# Patient Record
Sex: MALE | Race: WHITE | NOT HISPANIC OR LATINO
[De-identification: names, ages, dates, MRNs, and addresses within clinical notes are randomized per-mention and may not be internally consistent; named-entity substitution may affect disease eponyms.]

---

## 2017-07-29 ENCOUNTER — APPOINTMENT (OUTPATIENT)
Dept: INTERNAL MEDICINE | Facility: CLINIC | Age: 82
End: 2017-07-29
Payer: MEDICARE

## 2017-07-29 VITALS
SYSTOLIC BLOOD PRESSURE: 120 MMHG | TEMPERATURE: 97.9 F | HEART RATE: 75 BPM | WEIGHT: 179 LBS | DIASTOLIC BLOOD PRESSURE: 70 MMHG | HEIGHT: 65 IN | BODY MASS INDEX: 29.82 KG/M2 | OXYGEN SATURATION: 97 %

## 2017-07-29 DIAGNOSIS — R60.9 EDEMA, UNSPECIFIED: ICD-10-CM

## 2017-07-29 DIAGNOSIS — E78.5 HYPERLIPIDEMIA, UNSPECIFIED: ICD-10-CM

## 2017-07-29 DIAGNOSIS — Z85.89 PERSONAL HISTORY OF MALIGNANT NEOPLASM OF OTHER ORGANS AND SYSTEMS: ICD-10-CM

## 2017-07-29 PROCEDURE — 99213 OFFICE O/P EST LOW 20 MIN: CPT

## 2017-08-07 ENCOUNTER — APPOINTMENT (OUTPATIENT)
Dept: INTERNAL MEDICINE | Facility: CLINIC | Age: 82
End: 2017-08-07
Payer: MEDICARE

## 2017-08-07 VITALS
RESPIRATION RATE: 18 BRPM | TEMPERATURE: 97.8 F | OXYGEN SATURATION: 96 % | DIASTOLIC BLOOD PRESSURE: 70 MMHG | HEIGHT: 65 IN | SYSTOLIC BLOOD PRESSURE: 130 MMHG | HEART RATE: 68 BPM | WEIGHT: 179 LBS | BODY MASS INDEX: 29.82 KG/M2

## 2017-08-07 PROCEDURE — 99214 OFFICE O/P EST MOD 30 MIN: CPT

## 2017-08-07 RX ORDER — BUDESONIDE 32 UG/1
32 SPRAY, METERED NASAL DAILY
Qty: 1 | Refills: 2 | Status: ACTIVE | COMMUNITY
Start: 2017-08-07

## 2017-11-11 ENCOUNTER — APPOINTMENT (OUTPATIENT)
Dept: INTERNAL MEDICINE | Facility: CLINIC | Age: 82
End: 2017-11-11
Payer: MEDICARE

## 2017-11-11 VITALS
HEIGHT: 65 IN | SYSTOLIC BLOOD PRESSURE: 130 MMHG | DIASTOLIC BLOOD PRESSURE: 70 MMHG | HEART RATE: 63 BPM | TEMPERATURE: 97.7 F | BODY MASS INDEX: 29.32 KG/M2 | OXYGEN SATURATION: 95 % | WEIGHT: 176 LBS

## 2017-11-11 DIAGNOSIS — Z23 ENCOUNTER FOR IMMUNIZATION: ICD-10-CM

## 2017-11-11 PROCEDURE — 99214 OFFICE O/P EST MOD 30 MIN: CPT

## 2017-12-16 ENCOUNTER — APPOINTMENT (OUTPATIENT)
Dept: INTERNAL MEDICINE | Facility: CLINIC | Age: 82
End: 2017-12-16
Payer: MEDICARE

## 2017-12-16 VITALS
TEMPERATURE: 97.9 F | OXYGEN SATURATION: 96 % | DIASTOLIC BLOOD PRESSURE: 62 MMHG | BODY MASS INDEX: 29.82 KG/M2 | SYSTOLIC BLOOD PRESSURE: 130 MMHG | WEIGHT: 179 LBS | HEIGHT: 65 IN | HEART RATE: 68 BPM

## 2017-12-16 DIAGNOSIS — R06.02 SHORTNESS OF BREATH: ICD-10-CM

## 2017-12-16 DIAGNOSIS — R05 COUGH: ICD-10-CM

## 2017-12-16 PROCEDURE — 99214 OFFICE O/P EST MOD 30 MIN: CPT

## 2018-01-20 ENCOUNTER — APPOINTMENT (OUTPATIENT)
Dept: INTERNAL MEDICINE | Facility: CLINIC | Age: 83
End: 2018-01-20

## 2018-04-28 ENCOUNTER — APPOINTMENT (OUTPATIENT)
Dept: INTERNAL MEDICINE | Facility: CLINIC | Age: 83
End: 2018-04-28
Payer: MEDICARE

## 2018-04-28 VITALS
TEMPERATURE: 98.3 F | SYSTOLIC BLOOD PRESSURE: 140 MMHG | DIASTOLIC BLOOD PRESSURE: 60 MMHG | HEIGHT: 65 IN | HEART RATE: 73 BPM | WEIGHT: 186 LBS | OXYGEN SATURATION: 97 % | BODY MASS INDEX: 30.99 KG/M2

## 2018-04-28 PROCEDURE — 83036 HEMOGLOBIN GLYCOSYLATED A1C: CPT | Mod: QW

## 2018-04-28 PROCEDURE — 99214 OFFICE O/P EST MOD 30 MIN: CPT | Mod: 25

## 2018-04-28 RX ORDER — BENZONATATE 200 MG/1
200 CAPSULE ORAL
Qty: 28 | Refills: 1 | Status: DISCONTINUED | COMMUNITY
Start: 2017-11-11 | End: 2018-04-28

## 2018-04-28 RX ORDER — PREDNISONE 5 MG/1
5 TABLET ORAL
Qty: 36 | Refills: 1 | Status: DISCONTINUED | COMMUNITY
Start: 2017-11-11 | End: 2018-04-28

## 2018-04-30 LAB — HBA1C MFR BLD HPLC: 7.4

## 2018-06-22 ENCOUNTER — MEDICATION RENEWAL (OUTPATIENT)
Age: 83
End: 2018-06-22

## 2018-07-21 ENCOUNTER — APPOINTMENT (OUTPATIENT)
Dept: INTERNAL MEDICINE | Facility: CLINIC | Age: 83
End: 2018-07-21
Payer: MEDICARE

## 2018-07-21 ENCOUNTER — NON-APPOINTMENT (OUTPATIENT)
Age: 83
End: 2018-07-21

## 2018-07-21 ENCOUNTER — OTHER (OUTPATIENT)
Age: 83
End: 2018-07-21

## 2018-07-21 VITALS
TEMPERATURE: 98.5 F | WEIGHT: 178 LBS | SYSTOLIC BLOOD PRESSURE: 138 MMHG | OXYGEN SATURATION: 98 % | HEIGHT: 65 IN | BODY MASS INDEX: 29.66 KG/M2 | DIASTOLIC BLOOD PRESSURE: 72 MMHG | HEART RATE: 66 BPM

## 2018-07-21 DIAGNOSIS — J01.80 OTHER ACUTE SINUSITIS: ICD-10-CM

## 2018-07-21 DIAGNOSIS — R05 COUGH: ICD-10-CM

## 2018-07-21 DIAGNOSIS — M65.9 SYNOVITIS AND TENOSYNOVITIS, UNSPECIFIED: ICD-10-CM

## 2018-07-21 DIAGNOSIS — Z87.09 PERSONAL HISTORY OF OTHER DISEASES OF THE RESPIRATORY SYSTEM: ICD-10-CM

## 2018-07-21 PROCEDURE — 82962 GLUCOSE BLOOD TEST: CPT

## 2018-07-21 PROCEDURE — 83036 HEMOGLOBIN GLYCOSYLATED A1C: CPT | Mod: QW

## 2018-07-21 PROCEDURE — 99214 OFFICE O/P EST MOD 30 MIN: CPT | Mod: 25

## 2018-07-21 NOTE — PHYSICAL EXAM
[No Acute Distress] : no acute distress [Normal Sclera/Conjunctiva] : normal sclera/conjunctiva [Normal Outer Ear/Nose] : the outer ears and nose were normal in appearance [No JVD] : no jugular venous distention [No Respiratory Distress] : no respiratory distress  [Clear to Auscultation] : lungs were clear to auscultation bilaterally [Normal Rate] : normal rate  [Regular Rhythm] : with a regular rhythm [Alert and Oriented x3] : oriented to person, place, and time

## 2018-07-21 NOTE — HISTORY OF PRESENT ILLNESS
[FreeTextEntry1] : f/u to CAD, HTN and DM. [de-identified] : Pt denies any cardiac symptoms. His HTN si controlled and the DM to be tested today.

## 2018-07-23 LAB
GLUCOSE BLDC GLUCOMTR-MCNC: 132
HBA1C MFR BLD HPLC: 6.8

## 2018-11-03 ENCOUNTER — APPOINTMENT (OUTPATIENT)
Dept: INTERNAL MEDICINE | Facility: CLINIC | Age: 83
End: 2018-11-03
Payer: MEDICARE

## 2018-11-03 VITALS
SYSTOLIC BLOOD PRESSURE: 140 MMHG | TEMPERATURE: 98 F | HEART RATE: 75 BPM | WEIGHT: 181 LBS | BODY MASS INDEX: 30.16 KG/M2 | DIASTOLIC BLOOD PRESSURE: 80 MMHG | OXYGEN SATURATION: 95 % | HEIGHT: 65 IN

## 2018-11-03 PROCEDURE — 99214 OFFICE O/P EST MOD 30 MIN: CPT

## 2018-12-06 ENCOUNTER — MEDICATION RENEWAL (OUTPATIENT)
Age: 83
End: 2018-12-06

## 2019-02-09 ENCOUNTER — APPOINTMENT (OUTPATIENT)
Dept: INTERNAL MEDICINE | Facility: CLINIC | Age: 84
End: 2019-02-09
Payer: MEDICARE

## 2019-02-09 VITALS
WEIGHT: 183 LBS | BODY MASS INDEX: 30.49 KG/M2 | TEMPERATURE: 97.7 F | OXYGEN SATURATION: 97 % | HEIGHT: 65 IN | DIASTOLIC BLOOD PRESSURE: 70 MMHG | HEART RATE: 70 BPM | SYSTOLIC BLOOD PRESSURE: 144 MMHG

## 2019-02-09 DIAGNOSIS — N40.0 BENIGN PROSTATIC HYPERPLASIA WITHOUT LOWER URINARY TRACT SYMPMS: ICD-10-CM

## 2019-02-09 PROCEDURE — 83036 HEMOGLOBIN GLYCOSYLATED A1C: CPT | Mod: QW

## 2019-02-09 PROCEDURE — 99214 OFFICE O/P EST MOD 30 MIN: CPT | Mod: 25

## 2019-02-09 PROCEDURE — 82962 GLUCOSE BLOOD TEST: CPT

## 2019-02-09 RX ORDER — AMOXICILLIN 500 MG/1
500 CAPSULE ORAL
Qty: 21 | Refills: 0 | Status: COMPLETED | COMMUNITY
Start: 2018-11-13

## 2019-02-11 LAB
GLUCOSE BLDC GLUCOMTR-MCNC: 195
HBA1C MFR BLD HPLC: 7.3

## 2019-05-18 ENCOUNTER — APPOINTMENT (OUTPATIENT)
Dept: INTERNAL MEDICINE | Facility: CLINIC | Age: 84
End: 2019-05-18
Payer: MEDICARE

## 2019-05-18 VITALS
HEART RATE: 73 BPM | SYSTOLIC BLOOD PRESSURE: 120 MMHG | DIASTOLIC BLOOD PRESSURE: 60 MMHG | TEMPERATURE: 98.2 F | HEIGHT: 65 IN | BODY MASS INDEX: 29.99 KG/M2 | OXYGEN SATURATION: 95 % | WEIGHT: 180 LBS

## 2019-05-18 DIAGNOSIS — R91.1 SOLITARY PULMONARY NODULE: ICD-10-CM

## 2019-05-18 DIAGNOSIS — H04.123 DRY EYE SYNDROME OF BILATERAL LACRIMAL GLANDS: ICD-10-CM

## 2019-05-18 PROCEDURE — 99214 OFFICE O/P EST MOD 30 MIN: CPT

## 2019-05-18 RX ORDER — CYCLOSPORINE 0.5 MG/ML
0.05 EMULSION OPHTHALMIC
Qty: 1 | Refills: 0 | Status: ACTIVE | COMMUNITY
Start: 2019-05-18

## 2019-06-29 ENCOUNTER — APPOINTMENT (OUTPATIENT)
Dept: INTERNAL MEDICINE | Facility: CLINIC | Age: 84
End: 2019-06-29
Payer: MEDICARE

## 2019-06-29 VITALS
HEART RATE: 65 BPM | HEIGHT: 65 IN | DIASTOLIC BLOOD PRESSURE: 74 MMHG | WEIGHT: 180 LBS | TEMPERATURE: 98.1 F | BODY MASS INDEX: 29.99 KG/M2 | OXYGEN SATURATION: 98 % | SYSTOLIC BLOOD PRESSURE: 120 MMHG

## 2019-06-29 PROCEDURE — 82962 GLUCOSE BLOOD TEST: CPT

## 2019-06-29 PROCEDURE — 99214 OFFICE O/P EST MOD 30 MIN: CPT | Mod: 25

## 2019-06-29 PROCEDURE — 83036 HEMOGLOBIN GLYCOSYLATED A1C: CPT | Mod: QW

## 2019-07-01 LAB
GLUCOSE BLDC GLUCOMTR-MCNC: 132
HBA1C MFR BLD HPLC: 7.3

## 2019-10-26 ENCOUNTER — APPOINTMENT (OUTPATIENT)
Dept: INTERNAL MEDICINE | Facility: CLINIC | Age: 84
End: 2019-10-26
Payer: MEDICARE

## 2019-10-26 VITALS
DIASTOLIC BLOOD PRESSURE: 80 MMHG | HEIGHT: 65 IN | HEART RATE: 65 BPM | SYSTOLIC BLOOD PRESSURE: 140 MMHG | WEIGHT: 178 LBS | TEMPERATURE: 98.4 F | OXYGEN SATURATION: 98 % | BODY MASS INDEX: 29.66 KG/M2

## 2019-10-26 PROCEDURE — 82962 GLUCOSE BLOOD TEST: CPT

## 2019-10-26 PROCEDURE — 83036 HEMOGLOBIN GLYCOSYLATED A1C: CPT | Mod: QW

## 2019-10-26 PROCEDURE — 99214 OFFICE O/P EST MOD 30 MIN: CPT | Mod: 25

## 2019-10-28 LAB
GLUCOSE BLDC GLUCOMTR-MCNC: 108
HBA1C MFR BLD HPLC: 6.5

## 2020-02-22 ENCOUNTER — APPOINTMENT (OUTPATIENT)
Dept: INTERNAL MEDICINE | Facility: CLINIC | Age: 85
End: 2020-02-22
Payer: MEDICARE

## 2020-02-22 VITALS
WEIGHT: 176 LBS | HEIGHT: 65 IN | TEMPERATURE: 97.9 F | OXYGEN SATURATION: 96 % | SYSTOLIC BLOOD PRESSURE: 132 MMHG | DIASTOLIC BLOOD PRESSURE: 70 MMHG | HEART RATE: 66 BPM | BODY MASS INDEX: 29.32 KG/M2

## 2020-02-22 PROCEDURE — 99214 OFFICE O/P EST MOD 30 MIN: CPT | Mod: 25

## 2020-02-22 PROCEDURE — 82962 GLUCOSE BLOOD TEST: CPT

## 2020-02-22 PROCEDURE — 83036 HEMOGLOBIN GLYCOSYLATED A1C: CPT | Mod: QW

## 2020-02-24 LAB
GLUCOSE BLDC GLUCOMTR-MCNC: 125
HBA1C MFR BLD HPLC: 7.3

## 2020-03-16 ENCOUNTER — OUTPATIENT (OUTPATIENT)
Dept: OUTPATIENT SERVICES | Facility: HOSPITAL | Age: 85
LOS: 1 days | End: 2020-03-16

## 2020-03-16 ENCOUNTER — APPOINTMENT (OUTPATIENT)
Dept: NUCLEAR MEDICINE | Facility: CLINIC | Age: 85
End: 2020-03-16
Payer: MEDICARE

## 2020-03-16 ENCOUNTER — RESULT REVIEW (OUTPATIENT)
Age: 85
End: 2020-03-16

## 2020-03-16 DIAGNOSIS — R91.8 OTHER NONSPECIFIC ABNORMAL FINDING OF LUNG FIELD: ICD-10-CM

## 2020-03-16 PROCEDURE — 78815 PET IMAGE W/CT SKULL-THIGH: CPT | Mod: 26,PI

## 2020-04-04 ENCOUNTER — APPOINTMENT (OUTPATIENT)
Dept: INTERNAL MEDICINE | Facility: CLINIC | Age: 85
End: 2020-04-04
Payer: MEDICARE

## 2020-04-04 DIAGNOSIS — R91.8 OTHER NONSPECIFIC ABNORMAL FINDING OF LUNG FIELD: ICD-10-CM

## 2020-04-04 PROCEDURE — 99442: CPT

## 2020-10-10 ENCOUNTER — APPOINTMENT (OUTPATIENT)
Dept: INTERNAL MEDICINE | Facility: CLINIC | Age: 85
End: 2020-10-10
Payer: MEDICARE

## 2020-10-10 VITALS
HEIGHT: 65 IN | OXYGEN SATURATION: 96 % | WEIGHT: 176 LBS | HEART RATE: 70 BPM | DIASTOLIC BLOOD PRESSURE: 80 MMHG | BODY MASS INDEX: 29.32 KG/M2 | TEMPERATURE: 98.1 F | SYSTOLIC BLOOD PRESSURE: 140 MMHG

## 2020-10-10 PROCEDURE — 82962 GLUCOSE BLOOD TEST: CPT

## 2020-10-10 PROCEDURE — 83036 HEMOGLOBIN GLYCOSYLATED A1C: CPT | Mod: QW

## 2020-10-10 PROCEDURE — G0008: CPT

## 2020-10-10 PROCEDURE — 90662 IIV NO PRSV INCREASED AG IM: CPT

## 2020-10-10 PROCEDURE — 99214 OFFICE O/P EST MOD 30 MIN: CPT | Mod: 25

## 2020-10-12 LAB
GLUCOSE BLDC GLUCOMTR-MCNC: 120
HBA1C MFR BLD HPLC: 7

## 2021-01-01 ENCOUNTER — APPOINTMENT (OUTPATIENT)
Dept: INTERNAL MEDICINE | Facility: CLINIC | Age: 86
End: 2021-01-01

## 2021-01-01 ENCOUNTER — APPOINTMENT (OUTPATIENT)
Dept: INTERNAL MEDICINE | Facility: CLINIC | Age: 86
End: 2021-01-01
Payer: MEDICARE

## 2021-01-01 VITALS
DIASTOLIC BLOOD PRESSURE: 70 MMHG | BODY MASS INDEX: 29.99 KG/M2 | WEIGHT: 180 LBS | TEMPERATURE: 97.8 F | HEART RATE: 70 BPM | SYSTOLIC BLOOD PRESSURE: 150 MMHG | HEIGHT: 65 IN | OXYGEN SATURATION: 96 %

## 2021-01-01 DIAGNOSIS — L30.9 DERMATITIS, UNSPECIFIED: ICD-10-CM

## 2021-01-01 DIAGNOSIS — I25.10 ATHEROSCLEROTIC HEART DISEASE OF NATIVE CORONARY ARTERY W/OUT ANGINA PECTORIS: ICD-10-CM

## 2021-01-01 DIAGNOSIS — I10 ESSENTIAL (PRIMARY) HYPERTENSION: ICD-10-CM

## 2021-01-01 DIAGNOSIS — E11.9 TYPE 2 DIABETES MELLITUS W/OUT COMPLICATIONS: ICD-10-CM

## 2021-01-01 DIAGNOSIS — G54.6 PHANTOM LIMB SYNDROME WITH PAIN: ICD-10-CM

## 2021-01-01 LAB
ANION GAP SERPL CALC-SCNC: 9 MMOL/L
BUN SERPL-MCNC: 13 MG/DL
CALCIUM SERPL-MCNC: 9.8 MG/DL
CHLORIDE SERPL-SCNC: 105 MMOL/L
CO2 SERPL-SCNC: 28 MMOL/L
CREAT SERPL-MCNC: 0.82 MG/DL
GLUCOSE BLDC GLUCOMTR-MCNC: 118
GLUCOSE SERPL-MCNC: 144 MG/DL
POTASSIUM SERPL-SCNC: 4.9 MMOL/L
SODIUM SERPL-SCNC: 142 MMOL/L

## 2021-01-01 PROCEDURE — 83036 HEMOGLOBIN GLYCOSYLATED A1C: CPT | Mod: QW

## 2021-01-01 PROCEDURE — 90662 IIV NO PRSV INCREASED AG IM: CPT

## 2021-01-01 PROCEDURE — 99214 OFFICE O/P EST MOD 30 MIN: CPT | Mod: 25

## 2021-01-01 PROCEDURE — G0008: CPT

## 2021-01-01 PROCEDURE — 82962 GLUCOSE BLOOD TEST: CPT

## 2021-01-01 PROCEDURE — 36415 COLL VENOUS BLD VENIPUNCTURE: CPT

## 2021-01-01 RX ORDER — CRISABOROLE 20 MG/G
2 OINTMENT TOPICAL DAILY
Qty: 1 | Refills: 0 | Status: ACTIVE | COMMUNITY
Start: 2021-01-01

## 2021-01-01 RX ORDER — DUTASTERIDE 0.5 MG/1
0.5 CAPSULE, LIQUID FILLED ORAL
Qty: 30 | Refills: 2 | Status: ACTIVE | COMMUNITY
Start: 2021-01-01 | End: 1900-01-01

## 2021-01-01 RX ORDER — POLYVINYL ALCOHOL 14 MG/ML
1.4 SOLUTION/ DROPS OPHTHALMIC
Qty: 1 | Refills: 0 | Status: ACTIVE | COMMUNITY
Start: 2021-01-01

## 2021-01-09 ENCOUNTER — APPOINTMENT (OUTPATIENT)
Dept: INTERNAL MEDICINE | Facility: CLINIC | Age: 86
End: 2021-01-09
Payer: MEDICARE

## 2021-01-09 VITALS
HEIGHT: 65 IN | WEIGHT: 182 LBS | BODY MASS INDEX: 30.32 KG/M2 | OXYGEN SATURATION: 96 % | DIASTOLIC BLOOD PRESSURE: 70 MMHG | SYSTOLIC BLOOD PRESSURE: 130 MMHG | TEMPERATURE: 97.3 F | HEART RATE: 71 BPM

## 2021-01-09 DIAGNOSIS — J30.9 ALLERGIC RHINITIS, UNSPECIFIED: ICD-10-CM

## 2021-01-09 DIAGNOSIS — C91.40 HAIRY CELL LEUKEMIA NOT HAVING ACHIEVED REMISSION: ICD-10-CM

## 2021-01-09 PROCEDURE — 99072 ADDL SUPL MATRL&STAF TM PHE: CPT

## 2021-01-09 PROCEDURE — 83036 HEMOGLOBIN GLYCOSYLATED A1C: CPT | Mod: QW

## 2021-01-09 PROCEDURE — 99214 OFFICE O/P EST MOD 30 MIN: CPT | Mod: 25

## 2021-10-04 PROBLEM — L30.9 ECZEMA: Status: ACTIVE | Noted: 2021-01-01

## 2021-10-04 PROBLEM — G54.6 PHANTOM LIMB PAIN: Status: ACTIVE | Noted: 2019-10-26

## 2022-01-01 ENCOUNTER — TRANSCRIPTION ENCOUNTER (OUTPATIENT)
Age: 87
End: 2022-01-01

## 2022-01-01 ENCOUNTER — INPATIENT (INPATIENT)
Facility: HOSPITAL | Age: 87
LOS: 9 days | DRG: 329 | End: 2022-01-23
Attending: SURGERY | Admitting: SURGERY
Payer: MEDICARE

## 2022-01-01 ENCOUNTER — EMERGENCY (EMERGENCY)
Facility: HOSPITAL | Age: 87
LOS: 1 days | Discharge: HOME | End: 2022-01-01
Attending: STUDENT IN AN ORGANIZED HEALTH CARE EDUCATION/TRAINING PROGRAM
Payer: MEDICARE

## 2022-01-01 ENCOUNTER — RESULT REVIEW (OUTPATIENT)
Age: 87
End: 2022-01-01

## 2022-01-01 VITALS
RESPIRATION RATE: 18 BRPM | HEART RATE: 86 BPM | SYSTOLIC BLOOD PRESSURE: 153 MMHG | OXYGEN SATURATION: 98 % | TEMPERATURE: 98 F | DIASTOLIC BLOOD PRESSURE: 94 MMHG

## 2022-01-01 VITALS
SYSTOLIC BLOOD PRESSURE: 189 MMHG | RESPIRATION RATE: 20 BRPM | OXYGEN SATURATION: 96 % | TEMPERATURE: 98 F | DIASTOLIC BLOOD PRESSURE: 105 MMHG | HEART RATE: 74 BPM | HEIGHT: 64 IN | WEIGHT: 179.9 LBS

## 2022-01-01 VITALS
OXYGEN SATURATION: 97 % | TEMPERATURE: 98 F | SYSTOLIC BLOOD PRESSURE: 85 MMHG | DIASTOLIC BLOOD PRESSURE: 58 MMHG | WEIGHT: 169.98 LBS | RESPIRATION RATE: 20 BRPM | HEART RATE: 97 BPM | HEIGHT: 64 IN

## 2022-01-01 DIAGNOSIS — Z90.49 ACQUIRED ABSENCE OF OTHER SPECIFIED PARTS OF DIGESTIVE TRACT: Chronic | ICD-10-CM

## 2022-01-01 DIAGNOSIS — K56.609 UNSPECIFIED INTESTINAL OBSTRUCTION, UNSPECIFIED AS TO PARTIAL VERSUS COMPLETE OBSTRUCTION: ICD-10-CM

## 2022-01-01 LAB
-  AMIKACIN: SIGNIFICANT CHANGE UP
-  AMOXICILLIN/CLAVULANIC ACID: SIGNIFICANT CHANGE UP
-  AMPICILLIN/SULBACTAM: SIGNIFICANT CHANGE UP
-  AMPICILLIN: SIGNIFICANT CHANGE UP
-  AZTREONAM: SIGNIFICANT CHANGE UP
-  CEFAZOLIN: SIGNIFICANT CHANGE UP
-  CEFEPIME: SIGNIFICANT CHANGE UP
-  CEFOXITIN: SIGNIFICANT CHANGE UP
-  CEFTRIAXONE: SIGNIFICANT CHANGE UP
-  CIPROFLOXACIN: SIGNIFICANT CHANGE UP
-  ERTAPENEM: SIGNIFICANT CHANGE UP
-  GENTAMICIN: SIGNIFICANT CHANGE UP
-  LEVOFLOXACIN: SIGNIFICANT CHANGE UP
-  MEROPENEM: SIGNIFICANT CHANGE UP
-  PIPERACILLIN/TAZOBACTAM: SIGNIFICANT CHANGE UP
-  TOBRAMYCIN: SIGNIFICANT CHANGE UP
-  TRIMETHOPRIM/SULFAMETHOXAZOLE: SIGNIFICANT CHANGE UP
A1C WITH ESTIMATED AVERAGE GLUCOSE RESULT: 8.6 % — HIGH (ref 4–5.6)
ABO RH CONFIRMATION: SIGNIFICANT CHANGE UP
ACETONE SERPL-MCNC: NEGATIVE — SIGNIFICANT CHANGE UP
ACETONE SERPL-MCNC: NEGATIVE — SIGNIFICANT CHANGE UP
ALBUMIN SERPL ELPH-MCNC: 1.5 G/DL — LOW (ref 3.3–5.2)
ALBUMIN SERPL ELPH-MCNC: 1.5 G/DL — LOW (ref 3.3–5.2)
ALBUMIN SERPL ELPH-MCNC: 1.6 G/DL — LOW (ref 3.3–5.2)
ALBUMIN SERPL ELPH-MCNC: 2 G/DL — LOW (ref 3.3–5.2)
ALBUMIN SERPL ELPH-MCNC: 3.6 G/DL — SIGNIFICANT CHANGE UP (ref 3.3–5.2)
ALP SERPL-CCNC: 108 U/L — SIGNIFICANT CHANGE UP (ref 40–120)
ALP SERPL-CCNC: 112 U/L — SIGNIFICANT CHANGE UP (ref 40–120)
ALP SERPL-CCNC: 125 U/L — HIGH (ref 40–120)
ALP SERPL-CCNC: 73 U/L — SIGNIFICANT CHANGE UP (ref 40–120)
ALP SERPL-CCNC: 95 U/L — SIGNIFICANT CHANGE UP (ref 40–120)
ALT FLD-CCNC: 11 U/L — SIGNIFICANT CHANGE UP
ALT FLD-CCNC: 17 U/L — SIGNIFICANT CHANGE UP
ALT FLD-CCNC: 20 U/L — SIGNIFICANT CHANGE UP
ALT FLD-CCNC: 32 U/L — SIGNIFICANT CHANGE UP
ALT FLD-CCNC: 40 U/L — SIGNIFICANT CHANGE UP
ANION GAP SERPL CALC-SCNC: 10 MMOL/L — SIGNIFICANT CHANGE UP (ref 5–17)
ANION GAP SERPL CALC-SCNC: 10 MMOL/L — SIGNIFICANT CHANGE UP (ref 5–17)
ANION GAP SERPL CALC-SCNC: 11 MMOL/L — SIGNIFICANT CHANGE UP (ref 5–17)
ANION GAP SERPL CALC-SCNC: 12 MMOL/L — SIGNIFICANT CHANGE UP (ref 5–17)
ANION GAP SERPL CALC-SCNC: 12 MMOL/L — SIGNIFICANT CHANGE UP (ref 5–17)
ANION GAP SERPL CALC-SCNC: 13 MMOL/L — SIGNIFICANT CHANGE UP (ref 5–17)
ANION GAP SERPL CALC-SCNC: 13 MMOL/L — SIGNIFICANT CHANGE UP (ref 5–17)
ANION GAP SERPL CALC-SCNC: 14 MMOL/L — SIGNIFICANT CHANGE UP (ref 5–17)
ANION GAP SERPL CALC-SCNC: 15 MMOL/L — SIGNIFICANT CHANGE UP (ref 5–17)
ANION GAP SERPL CALC-SCNC: 15 MMOL/L — SIGNIFICANT CHANGE UP (ref 5–17)
ANION GAP SERPL CALC-SCNC: 16 MMOL/L — SIGNIFICANT CHANGE UP (ref 5–17)
ANION GAP SERPL CALC-SCNC: 17 MMOL/L — SIGNIFICANT CHANGE UP (ref 5–17)
ANION GAP SERPL CALC-SCNC: 17 MMOL/L — SIGNIFICANT CHANGE UP (ref 5–17)
ANION GAP SERPL CALC-SCNC: 18 MMOL/L — HIGH (ref 5–17)
ANION GAP SERPL CALC-SCNC: 19 MMOL/L — HIGH (ref 5–17)
ANION GAP SERPL CALC-SCNC: 19 MMOL/L — HIGH (ref 5–17)
ANION GAP SERPL CALC-SCNC: 20 MMOL/L — HIGH (ref 5–17)
ANION GAP SERPL CALC-SCNC: 22 MMOL/L — HIGH (ref 5–17)
ANISOCYTOSIS BLD QL: SLIGHT — SIGNIFICANT CHANGE UP
APPEARANCE UR: ABNORMAL
APTT BLD: 23.9 SEC — LOW (ref 27.5–35.5)
APTT BLD: 29.2 SEC — SIGNIFICANT CHANGE UP (ref 27.5–35.5)
APTT BLD: 29.4 SEC — SIGNIFICANT CHANGE UP (ref 27.5–35.5)
APTT BLD: 53.5 SEC — HIGH (ref 27.5–35.5)
APTT BLD: 65 SEC — HIGH (ref 27.5–35.5)
APTT BLD: 81 SEC — HIGH (ref 27.5–35.5)
APTT BLD: 87.7 SEC — HIGH (ref 27.5–35.5)
AST SERPL-CCNC: 12 U/L — SIGNIFICANT CHANGE UP
AST SERPL-CCNC: 23 U/L — SIGNIFICANT CHANGE UP
AST SERPL-CCNC: 35 U/L — SIGNIFICANT CHANGE UP
AST SERPL-CCNC: 50 U/L — HIGH
AST SERPL-CCNC: 79 U/L — HIGH
BACTERIA # UR AUTO: ABNORMAL
BASE EXCESS BLDV CALC-SCNC: -9 MMOL/L — LOW (ref -2–3)
BASE EXCESS BLDV CALC-SCNC: 0.8 MMOL/L — SIGNIFICANT CHANGE UP (ref -2–3)
BASOPHILS # BLD AUTO: 0 K/UL — SIGNIFICANT CHANGE UP (ref 0–0.2)
BASOPHILS # BLD AUTO: 0.03 K/UL — SIGNIFICANT CHANGE UP (ref 0–0.2)
BASOPHILS # BLD AUTO: 0.03 K/UL — SIGNIFICANT CHANGE UP (ref 0–0.2)
BASOPHILS # BLD AUTO: 0.06 K/UL — SIGNIFICANT CHANGE UP (ref 0–0.2)
BASOPHILS # BLD AUTO: 0.07 K/UL — SIGNIFICANT CHANGE UP (ref 0–0.2)
BASOPHILS # BLD AUTO: 0.11 K/UL — SIGNIFICANT CHANGE UP (ref 0–0.2)
BASOPHILS # BLD AUTO: 0.14 K/UL — SIGNIFICANT CHANGE UP (ref 0–0.2)
BASOPHILS NFR BLD AUTO: 0 % — SIGNIFICANT CHANGE UP (ref 0–2)
BASOPHILS NFR BLD AUTO: 0.1 % — SIGNIFICANT CHANGE UP (ref 0–2)
BASOPHILS NFR BLD AUTO: 0.2 % — SIGNIFICANT CHANGE UP (ref 0–2)
BASOPHILS NFR BLD AUTO: 0.3 % — SIGNIFICANT CHANGE UP (ref 0–2)
BASOPHILS NFR BLD AUTO: 0.3 % — SIGNIFICANT CHANGE UP (ref 0–2)
BASOPHILS NFR BLD AUTO: 0.5 % — SIGNIFICANT CHANGE UP (ref 0–2)
BASOPHILS NFR BLD AUTO: 0.5 % — SIGNIFICANT CHANGE UP (ref 0–2)
BILIRUB DIRECT SERPL-MCNC: 0.2 MG/DL — SIGNIFICANT CHANGE UP (ref 0–0.3)
BILIRUB INDIRECT FLD-MCNC: 0.1 MG/DL — LOW (ref 0.2–1)
BILIRUB SERPL-MCNC: 0.3 MG/DL — LOW (ref 0.4–2)
BILIRUB SERPL-MCNC: 0.4 MG/DL — SIGNIFICANT CHANGE UP (ref 0.4–2)
BILIRUB SERPL-MCNC: 0.4 MG/DL — SIGNIFICANT CHANGE UP (ref 0.4–2)
BILIRUB SERPL-MCNC: 0.5 MG/DL — SIGNIFICANT CHANGE UP (ref 0.4–2)
BILIRUB SERPL-MCNC: 0.7 MG/DL — SIGNIFICANT CHANGE UP (ref 0.4–2)
BILIRUB UR-MCNC: NEGATIVE — SIGNIFICANT CHANGE UP
BLD GP AB SCN SERPL QL: SIGNIFICANT CHANGE UP
BLOOD GAS COMMENTS, VENOUS: SIGNIFICANT CHANGE UP
BODY SURFACE AREA CALCULATION: 1.73 M2 — SIGNIFICANT CHANGE UP
BUN SERPL-MCNC: 18.1 MG/DL — SIGNIFICANT CHANGE UP (ref 8–20)
BUN SERPL-MCNC: 19 MG/DL — SIGNIFICANT CHANGE UP (ref 8–20)
BUN SERPL-MCNC: 20.2 MG/DL — HIGH (ref 8–20)
BUN SERPL-MCNC: 23 MG/DL — HIGH (ref 8–20)
BUN SERPL-MCNC: 26.2 MG/DL — HIGH (ref 8–20)
BUN SERPL-MCNC: 29.9 MG/DL — HIGH (ref 8–20)
BUN SERPL-MCNC: 32.5 MG/DL — HIGH (ref 8–20)
BUN SERPL-MCNC: 45.4 MG/DL — HIGH (ref 8–20)
BUN SERPL-MCNC: 48.2 MG/DL — HIGH (ref 8–20)
BUN SERPL-MCNC: 51.8 MG/DL — HIGH (ref 8–20)
BUN SERPL-MCNC: 55.6 MG/DL — HIGH (ref 8–20)
BUN SERPL-MCNC: 60.5 MG/DL — HIGH (ref 8–20)
BUN SERPL-MCNC: 60.5 MG/DL — HIGH (ref 8–20)
BUN SERPL-MCNC: 61.9 MG/DL — HIGH (ref 8–20)
BUN SERPL-MCNC: 69.3 MG/DL — HIGH (ref 8–20)
BUN SERPL-MCNC: 70.4 MG/DL — HIGH (ref 8–20)
BUN SERPL-MCNC: 73.7 MG/DL — HIGH (ref 8–20)
BUN SERPL-MCNC: 76.2 MG/DL — HIGH (ref 8–20)
BUN SERPL-MCNC: 79.1 MG/DL — HIGH (ref 8–20)
BUN SERPL-MCNC: 93.6 MG/DL — HIGH (ref 8–20)
BURR CELLS BLD QL SMEAR: PRESENT — SIGNIFICANT CHANGE UP
CA-I SERPL-SCNC: 1.15 MMOL/L — SIGNIFICANT CHANGE UP (ref 1.15–1.33)
CALCIUM SERPL-MCNC: 6.8 MG/DL — LOW (ref 8.6–10.2)
CALCIUM SERPL-MCNC: 6.8 MG/DL — LOW (ref 8.6–10.2)
CALCIUM SERPL-MCNC: 6.9 MG/DL — LOW (ref 8.6–10.2)
CALCIUM SERPL-MCNC: 7.1 MG/DL — LOW (ref 8.6–10.2)
CALCIUM SERPL-MCNC: 7.2 MG/DL — LOW (ref 8.6–10.2)
CALCIUM SERPL-MCNC: 7.3 MG/DL — LOW (ref 8.6–10.2)
CALCIUM SERPL-MCNC: 7.4 MG/DL — LOW (ref 8.6–10.2)
CALCIUM SERPL-MCNC: 7.5 MG/DL — LOW (ref 8.6–10.2)
CALCIUM SERPL-MCNC: 7.5 MG/DL — LOW (ref 8.6–10.2)
CALCIUM SERPL-MCNC: 7.6 MG/DL — LOW (ref 8.6–10.2)
CALCIUM SERPL-MCNC: 7.7 MG/DL — LOW (ref 8.6–10.2)
CALCIUM SERPL-MCNC: 7.8 MG/DL — LOW (ref 8.6–10.2)
CALCIUM SERPL-MCNC: 7.8 MG/DL — LOW (ref 8.6–10.2)
CALCIUM SERPL-MCNC: 7.9 MG/DL — LOW (ref 8.6–10.2)
CALCIUM SERPL-MCNC: 7.9 MG/DL — LOW (ref 8.6–10.2)
CALCIUM SERPL-MCNC: 8.5 MG/DL — LOW (ref 8.6–10.2)
CALCIUM SERPL-MCNC: 9.2 MG/DL — SIGNIFICANT CHANGE UP (ref 8.6–10.2)
CALCIUM SERPL-MCNC: 9.3 MG/DL — SIGNIFICANT CHANGE UP (ref 8.6–10.2)
CHLORIDE BLDV-SCNC: 97 MMOL/L — LOW (ref 98–107)
CHLORIDE SERPL-SCNC: 108 MMOL/L — HIGH (ref 98–107)
CHLORIDE SERPL-SCNC: 109 MMOL/L — HIGH (ref 98–107)
CHLORIDE SERPL-SCNC: 110 MMOL/L — HIGH (ref 98–107)
CHLORIDE SERPL-SCNC: 112 MMOL/L — HIGH (ref 98–107)
CHLORIDE SERPL-SCNC: 113 MMOL/L — HIGH (ref 98–107)
CHLORIDE SERPL-SCNC: 116 MMOL/L — HIGH (ref 98–107)
CHLORIDE SERPL-SCNC: 118 MMOL/L — HIGH (ref 98–107)
CHLORIDE SERPL-SCNC: 119 MMOL/L — HIGH (ref 98–107)
CHLORIDE SERPL-SCNC: 119 MMOL/L — HIGH (ref 98–107)
CHLORIDE SERPL-SCNC: 120 MMOL/L — HIGH (ref 98–107)
CHLORIDE SERPL-SCNC: 120 MMOL/L — HIGH (ref 98–107)
CHLORIDE SERPL-SCNC: 95 MMOL/L — LOW (ref 98–107)
CHLORIDE SERPL-SCNC: 96 MMOL/L — LOW (ref 98–107)
CHLORIDE UR-SCNC: 115 MMOL/L — SIGNIFICANT CHANGE UP
CK SERPL-CCNC: 34 U/L — SIGNIFICANT CHANGE UP (ref 30–200)
CO2 SERPL-SCNC: 13 MMOL/L — LOW (ref 22–29)
CO2 SERPL-SCNC: 14 MMOL/L — LOW (ref 22–29)
CO2 SERPL-SCNC: 15 MMOL/L — LOW (ref 22–29)
CO2 SERPL-SCNC: 15 MMOL/L — LOW (ref 22–29)
CO2 SERPL-SCNC: 18 MMOL/L — LOW (ref 22–29)
CO2 SERPL-SCNC: 19 MMOL/L — LOW (ref 22–29)
CO2 SERPL-SCNC: 20 MMOL/L — LOW (ref 22–29)
CO2 SERPL-SCNC: 20 MMOL/L — LOW (ref 22–29)
CO2 SERPL-SCNC: 21 MMOL/L — LOW (ref 22–29)
CO2 SERPL-SCNC: 22 MMOL/L — SIGNIFICANT CHANGE UP (ref 22–29)
CO2 SERPL-SCNC: 23 MMOL/L — SIGNIFICANT CHANGE UP (ref 22–29)
CO2 SERPL-SCNC: 23 MMOL/L — SIGNIFICANT CHANGE UP (ref 22–29)
COLLECT DURATION TIME UR: 24 HR — SIGNIFICANT CHANGE UP
COLLECT DURATION TIME UR: 24 HR — SIGNIFICANT CHANGE UP
COLOR SPEC: ABNORMAL
COMMENT - URINE: SIGNIFICANT CHANGE UP
CREAT ?TM UR-MCNC: 73 MG/DL — SIGNIFICANT CHANGE UP
CREAT CL ?TM UR+SERPL-VRATE: 64 ML/MIN — LOW (ref 85–125)
CREAT SERPL-MCNC: 0.66 MG/DL — SIGNIFICANT CHANGE UP (ref 0.5–1.3)
CREAT SERPL-MCNC: 0.71 MG/DL — SIGNIFICANT CHANGE UP (ref 0.5–1.3)
CREAT SERPL-MCNC: 0.73 MG/DL — SIGNIFICANT CHANGE UP (ref 0.5–1.3)
CREAT SERPL-MCNC: 0.77 MG/DL — SIGNIFICANT CHANGE UP (ref 0.5–1.3)
CREAT SERPL-MCNC: 0.78 MG/DL — SIGNIFICANT CHANGE UP (ref 0.5–1.3)
CREAT SERPL-MCNC: 0.81 MG/DL — SIGNIFICANT CHANGE UP (ref 0.5–1.3)
CREAT SERPL-MCNC: 0.85 MG/DL — SIGNIFICANT CHANGE UP (ref 0.5–1.3)
CREAT SERPL-MCNC: 1.04 MG/DL — SIGNIFICANT CHANGE UP (ref 0.5–1.3)
CREAT SERPL-MCNC: 1.07 MG/DL — SIGNIFICANT CHANGE UP (ref 0.5–1.3)
CREAT SERPL-MCNC: 1.12 MG/DL — SIGNIFICANT CHANGE UP (ref 0.5–1.3)
CREAT SERPL-MCNC: 1.13 MG/DL — SIGNIFICANT CHANGE UP (ref 0.5–1.3)
CREAT SERPL-MCNC: 1.18 MG/DL — SIGNIFICANT CHANGE UP (ref 0.5–1.3)
CREAT SERPL-MCNC: 1.44 MG/DL — HIGH (ref 0.5–1.3)
CREAT SERPL-MCNC: 1.72 MG/DL — HIGH (ref 0.5–1.3)
CREAT SERPL-MCNC: 1.85 MG/DL — HIGH (ref 0.5–1.3)
CREAT SERPL-MCNC: 1.9 MG/DL — HIGH (ref 0.5–1.3)
CREAT SERPL-MCNC: 1.96 MG/DL — HIGH (ref 0.5–1.3)
CREAT SERPL-MCNC: 1.96 MG/DL — HIGH (ref 0.5–1.3)
CREAT SERPL-MCNC: 2.03 MG/DL — HIGH (ref 0.5–1.3)
CREAT SERPL-MCNC: 2.07 MG/DL — HIGH (ref 0.5–1.3)
CULTURE RESULTS: SIGNIFICANT CHANGE UP
DACRYOCYTES BLD QL SMEAR: SLIGHT — SIGNIFICANT CHANGE UP
DIFF PNL FLD: ABNORMAL
EOSINOPHIL # BLD AUTO: 0 K/UL — SIGNIFICANT CHANGE UP (ref 0–0.5)
EOSINOPHIL # BLD AUTO: 0.01 K/UL — SIGNIFICANT CHANGE UP (ref 0–0.5)
EOSINOPHIL # BLD AUTO: 0.01 K/UL — SIGNIFICANT CHANGE UP (ref 0–0.5)
EOSINOPHIL # BLD AUTO: 0.03 K/UL — SIGNIFICANT CHANGE UP (ref 0–0.5)
EOSINOPHIL # BLD AUTO: 0.05 K/UL — SIGNIFICANT CHANGE UP (ref 0–0.5)
EOSINOPHIL # BLD AUTO: 0.09 K/UL — SIGNIFICANT CHANGE UP (ref 0–0.5)
EOSINOPHIL # BLD AUTO: 0.18 K/UL — SIGNIFICANT CHANGE UP (ref 0–0.5)
EOSINOPHIL # BLD AUTO: 0.4 K/UL — SIGNIFICANT CHANGE UP (ref 0–0.5)
EOSINOPHIL # BLD AUTO: 0.41 K/UL — SIGNIFICANT CHANGE UP (ref 0–0.5)
EOSINOPHIL NFR BLD AUTO: 0 % — SIGNIFICANT CHANGE UP (ref 0–6)
EOSINOPHIL NFR BLD AUTO: 0.2 % — SIGNIFICANT CHANGE UP (ref 0–6)
EOSINOPHIL NFR BLD AUTO: 0.2 % — SIGNIFICANT CHANGE UP (ref 0–6)
EOSINOPHIL NFR BLD AUTO: 0.4 % — SIGNIFICANT CHANGE UP (ref 0–6)
EOSINOPHIL NFR BLD AUTO: 0.9 % — SIGNIFICANT CHANGE UP (ref 0–6)
EOSINOPHIL NFR BLD AUTO: 1.6 % — SIGNIFICANT CHANGE UP (ref 0–6)
EOSINOPHIL NFR BLD AUTO: 1.8 % — SIGNIFICANT CHANGE UP (ref 0–6)
EPI CELLS # UR: SIGNIFICANT CHANGE UP
ESTIMATED AVERAGE GLUCOSE: 200 MG/DL — HIGH (ref 68–114)
GAS PNL BLDA: SIGNIFICANT CHANGE UP
GAS PNL BLDV: 135 MMOL/L — LOW (ref 136–145)
GAS PNL BLDV: SIGNIFICANT CHANGE UP
GAS PNL BLDV: SIGNIFICANT CHANGE UP
GIANT PLATELETS BLD QL SMEAR: PRESENT — SIGNIFICANT CHANGE UP
GLUCOSE BLDC GLUCOMTR-MCNC: 103 MG/DL — HIGH (ref 70–99)
GLUCOSE BLDC GLUCOMTR-MCNC: 104 MG/DL — HIGH (ref 70–99)
GLUCOSE BLDC GLUCOMTR-MCNC: 107 MG/DL — HIGH (ref 70–99)
GLUCOSE BLDC GLUCOMTR-MCNC: 109 MG/DL — HIGH (ref 70–99)
GLUCOSE BLDC GLUCOMTR-MCNC: 110 MG/DL — HIGH (ref 70–99)
GLUCOSE BLDC GLUCOMTR-MCNC: 119 MG/DL — HIGH (ref 70–99)
GLUCOSE BLDC GLUCOMTR-MCNC: 122 MG/DL — HIGH (ref 70–99)
GLUCOSE BLDC GLUCOMTR-MCNC: 125 MG/DL — HIGH (ref 70–99)
GLUCOSE BLDC GLUCOMTR-MCNC: 126 MG/DL — HIGH (ref 70–99)
GLUCOSE BLDC GLUCOMTR-MCNC: 129 MG/DL — HIGH (ref 70–99)
GLUCOSE BLDC GLUCOMTR-MCNC: 131 MG/DL — HIGH (ref 70–99)
GLUCOSE BLDC GLUCOMTR-MCNC: 132 MG/DL — HIGH (ref 70–99)
GLUCOSE BLDC GLUCOMTR-MCNC: 135 MG/DL — HIGH (ref 70–99)
GLUCOSE BLDC GLUCOMTR-MCNC: 142 MG/DL — HIGH (ref 70–99)
GLUCOSE BLDC GLUCOMTR-MCNC: 145 MG/DL — HIGH (ref 70–99)
GLUCOSE BLDC GLUCOMTR-MCNC: 147 MG/DL — HIGH (ref 70–99)
GLUCOSE BLDC GLUCOMTR-MCNC: 148 MG/DL — HIGH (ref 70–99)
GLUCOSE BLDC GLUCOMTR-MCNC: 148 MG/DL — HIGH (ref 70–99)
GLUCOSE BLDC GLUCOMTR-MCNC: 149 MG/DL — HIGH (ref 70–99)
GLUCOSE BLDC GLUCOMTR-MCNC: 150 MG/DL — HIGH (ref 70–99)
GLUCOSE BLDC GLUCOMTR-MCNC: 150 MG/DL — HIGH (ref 70–99)
GLUCOSE BLDC GLUCOMTR-MCNC: 152 MG/DL — HIGH (ref 70–99)
GLUCOSE BLDC GLUCOMTR-MCNC: 152 MG/DL — HIGH (ref 70–99)
GLUCOSE BLDC GLUCOMTR-MCNC: 155 MG/DL — HIGH (ref 70–99)
GLUCOSE BLDC GLUCOMTR-MCNC: 156 MG/DL — HIGH (ref 70–99)
GLUCOSE BLDC GLUCOMTR-MCNC: 160 MG/DL — HIGH (ref 70–99)
GLUCOSE BLDC GLUCOMTR-MCNC: 164 MG/DL — HIGH (ref 70–99)
GLUCOSE BLDC GLUCOMTR-MCNC: 166 MG/DL — HIGH (ref 70–99)
GLUCOSE BLDC GLUCOMTR-MCNC: 168 MG/DL — HIGH (ref 70–99)
GLUCOSE BLDC GLUCOMTR-MCNC: 169 MG/DL — HIGH (ref 70–99)
GLUCOSE BLDC GLUCOMTR-MCNC: 178 MG/DL — HIGH (ref 70–99)
GLUCOSE BLDC GLUCOMTR-MCNC: 180 MG/DL — HIGH (ref 70–99)
GLUCOSE BLDC GLUCOMTR-MCNC: 180 MG/DL — HIGH (ref 70–99)
GLUCOSE BLDC GLUCOMTR-MCNC: 182 MG/DL — HIGH (ref 70–99)
GLUCOSE BLDC GLUCOMTR-MCNC: 183 MG/DL — HIGH (ref 70–99)
GLUCOSE BLDC GLUCOMTR-MCNC: 185 MG/DL — HIGH (ref 70–99)
GLUCOSE BLDC GLUCOMTR-MCNC: 188 MG/DL — HIGH (ref 70–99)
GLUCOSE BLDC GLUCOMTR-MCNC: 190 MG/DL — HIGH (ref 70–99)
GLUCOSE BLDC GLUCOMTR-MCNC: 194 MG/DL — HIGH (ref 70–99)
GLUCOSE BLDC GLUCOMTR-MCNC: 197 MG/DL — HIGH (ref 70–99)
GLUCOSE BLDC GLUCOMTR-MCNC: 205 MG/DL — HIGH (ref 70–99)
GLUCOSE BLDC GLUCOMTR-MCNC: 207 MG/DL — HIGH (ref 70–99)
GLUCOSE BLDC GLUCOMTR-MCNC: 208 MG/DL — HIGH (ref 70–99)
GLUCOSE BLDC GLUCOMTR-MCNC: 213 MG/DL — HIGH (ref 70–99)
GLUCOSE BLDC GLUCOMTR-MCNC: 213 MG/DL — HIGH (ref 70–99)
GLUCOSE BLDC GLUCOMTR-MCNC: 225 MG/DL — HIGH (ref 70–99)
GLUCOSE BLDC GLUCOMTR-MCNC: 226 MG/DL — HIGH (ref 70–99)
GLUCOSE BLDC GLUCOMTR-MCNC: 229 MG/DL — HIGH (ref 70–99)
GLUCOSE BLDC GLUCOMTR-MCNC: 234 MG/DL — HIGH (ref 70–99)
GLUCOSE BLDC GLUCOMTR-MCNC: 240 MG/DL — HIGH (ref 70–99)
GLUCOSE BLDC GLUCOMTR-MCNC: 270 MG/DL — HIGH (ref 70–99)
GLUCOSE BLDC GLUCOMTR-MCNC: 288 MG/DL — HIGH (ref 70–99)
GLUCOSE BLDC GLUCOMTR-MCNC: 75 MG/DL — SIGNIFICANT CHANGE UP (ref 70–99)
GLUCOSE BLDC GLUCOMTR-MCNC: 80 MG/DL — SIGNIFICANT CHANGE UP (ref 70–99)
GLUCOSE BLDC GLUCOMTR-MCNC: 81 MG/DL — SIGNIFICANT CHANGE UP (ref 70–99)
GLUCOSE BLDC GLUCOMTR-MCNC: 91 MG/DL — SIGNIFICANT CHANGE UP (ref 70–99)
GLUCOSE BLDV-MCNC: >656 MG/DL — CRITICAL HIGH (ref 70–99)
GLUCOSE SERPL-MCNC: 113 MG/DL — HIGH (ref 70–99)
GLUCOSE SERPL-MCNC: 140 MG/DL — HIGH (ref 70–99)
GLUCOSE SERPL-MCNC: 143 MG/DL — HIGH (ref 70–99)
GLUCOSE SERPL-MCNC: 160 MG/DL — HIGH (ref 70–99)
GLUCOSE SERPL-MCNC: 161 MG/DL — HIGH (ref 70–99)
GLUCOSE SERPL-MCNC: 167 MG/DL — HIGH (ref 70–99)
GLUCOSE SERPL-MCNC: 174 MG/DL — HIGH (ref 70–99)
GLUCOSE SERPL-MCNC: 180 MG/DL — HIGH (ref 70–99)
GLUCOSE SERPL-MCNC: 181 MG/DL — HIGH (ref 70–99)
GLUCOSE SERPL-MCNC: 190 MG/DL — HIGH (ref 70–99)
GLUCOSE SERPL-MCNC: 198 MG/DL — HIGH (ref 70–99)
GLUCOSE SERPL-MCNC: 203 MG/DL — HIGH (ref 70–99)
GLUCOSE SERPL-MCNC: 206 MG/DL — HIGH (ref 70–99)
GLUCOSE SERPL-MCNC: 218 MG/DL — HIGH (ref 70–99)
GLUCOSE SERPL-MCNC: 236 MG/DL — HIGH (ref 70–99)
GLUCOSE SERPL-MCNC: 254 MG/DL — HIGH (ref 70–99)
GLUCOSE SERPL-MCNC: 289 MG/DL — HIGH (ref 70–99)
GLUCOSE SERPL-MCNC: 297 MG/DL — HIGH (ref 70–99)
GLUCOSE SERPL-MCNC: 662 MG/DL — CRITICAL HIGH (ref 70–99)
GLUCOSE SERPL-MCNC: 78 MG/DL — SIGNIFICANT CHANGE UP (ref 70–99)
GLUCOSE UR QL: 100 MG/DL
GLUCOSE UR QL: 100 MG/DL
GLUCOSE UR QL: 50 MG/DL
GRAM STN FLD: SIGNIFICANT CHANGE UP
GRAN CASTS # UR COMP ASSIST: ABNORMAL /LPF
GRAN CASTS # UR COMP ASSIST: ABNORMAL /LPF
HCO3 BLDV-SCNC: 18 MMOL/L — LOW (ref 22–29)
HCO3 BLDV-SCNC: 25 MMOL/L — SIGNIFICANT CHANGE UP (ref 22–29)
HCT VFR BLD CALC: 30.3 % — LOW (ref 39–50)
HCT VFR BLD CALC: 31.5 % — LOW (ref 39–50)
HCT VFR BLD CALC: 31.6 % — LOW (ref 39–50)
HCT VFR BLD CALC: 31.9 % — LOW (ref 39–50)
HCT VFR BLD CALC: 32.2 % — LOW (ref 39–50)
HCT VFR BLD CALC: 32.4 % — LOW (ref 39–50)
HCT VFR BLD CALC: 32.7 % — LOW (ref 39–50)
HCT VFR BLD CALC: 32.9 % — LOW (ref 39–50)
HCT VFR BLD CALC: 33.1 % — LOW (ref 39–50)
HCT VFR BLD CALC: 34.1 % — LOW (ref 39–50)
HCT VFR BLD CALC: 35 % — LOW (ref 39–50)
HCT VFR BLD CALC: 36.3 % — LOW (ref 39–50)
HCT VFR BLD CALC: 40.4 % — SIGNIFICANT CHANGE UP (ref 39–50)
HCT VFR BLD CALC: 43.5 % — SIGNIFICANT CHANGE UP (ref 39–50)
HCT VFR BLD CALC: 44.6 % — SIGNIFICANT CHANGE UP (ref 39–50)
HCT VFR BLD CALC: 46.2 % — SIGNIFICANT CHANGE UP (ref 39–50)
HCT VFR BLD CALC: 49.3 % — SIGNIFICANT CHANGE UP (ref 39–50)
HCT VFR BLDA CALC: 46 % — SIGNIFICANT CHANGE UP (ref 39–51)
HGB BLD CALC-MCNC: 15 G/DL — SIGNIFICANT CHANGE UP (ref 12.6–17.4)
HGB BLD-MCNC: 10.1 G/DL — LOW (ref 13–17)
HGB BLD-MCNC: 10.3 G/DL — LOW (ref 13–17)
HGB BLD-MCNC: 10.4 G/DL — LOW (ref 13–17)
HGB BLD-MCNC: 10.6 G/DL — LOW (ref 13–17)
HGB BLD-MCNC: 10.6 G/DL — LOW (ref 13–17)
HGB BLD-MCNC: 10.7 G/DL — LOW (ref 13–17)
HGB BLD-MCNC: 11.8 G/DL — LOW (ref 13–17)
HGB BLD-MCNC: 12.1 G/DL — LOW (ref 13–17)
HGB BLD-MCNC: 13 G/DL — SIGNIFICANT CHANGE UP (ref 13–17)
HGB BLD-MCNC: 14.4 G/DL — SIGNIFICANT CHANGE UP (ref 13–17)
HGB BLD-MCNC: 14.4 G/DL — SIGNIFICANT CHANGE UP (ref 13–17)
HGB BLD-MCNC: 15.1 G/DL — SIGNIFICANT CHANGE UP (ref 13–17)
HGB BLD-MCNC: 16.1 G/DL — SIGNIFICANT CHANGE UP (ref 13–17)
HGB BLD-MCNC: 9.6 G/DL — LOW (ref 13–17)
HOROWITZ INDEX BLDV+IHG-RTO: SIGNIFICANT CHANGE UP
HYALINE CASTS # UR AUTO: ABNORMAL /LPF
HYALINE CASTS # UR AUTO: ABNORMAL /LPF
HYPOCHROMIA BLD QL: SLIGHT — SIGNIFICANT CHANGE UP
IMM GRANULOCYTES NFR BLD AUTO: 0.8 % — SIGNIFICANT CHANGE UP (ref 0–1.5)
IMM GRANULOCYTES NFR BLD AUTO: 0.8 % — SIGNIFICANT CHANGE UP (ref 0–1.5)
IMM GRANULOCYTES NFR BLD AUTO: 1.1 % — SIGNIFICANT CHANGE UP (ref 0–1.5)
IMM GRANULOCYTES NFR BLD AUTO: 1.5 % — SIGNIFICANT CHANGE UP (ref 0–1.5)
IMM GRANULOCYTES NFR BLD AUTO: 2.5 % — HIGH (ref 0–1.5)
IMM GRANULOCYTES NFR BLD AUTO: 2.5 % — HIGH (ref 0–1.5)
INR BLD: 1.25 RATIO — HIGH (ref 0.88–1.16)
INR BLD: 1.39 RATIO — HIGH (ref 0.88–1.16)
INR BLD: 1.43 RATIO — HIGH (ref 0.88–1.16)
INR BLD: 1.51 RATIO — HIGH (ref 0.88–1.16)
KETONES UR-MCNC: ABNORMAL
KETONES UR-MCNC: ABNORMAL
KETONES UR-MCNC: NEGATIVE — SIGNIFICANT CHANGE UP
LACTATE BLDV-MCNC: 4.3 MMOL/L — CRITICAL HIGH (ref 0.5–2)
LEUKOCYTE ESTERASE UR-ACNC: ABNORMAL
LEUKOCYTE ESTERASE UR-ACNC: NEGATIVE — SIGNIFICANT CHANGE UP
LEUKOCYTE ESTERASE UR-ACNC: NEGATIVE — SIGNIFICANT CHANGE UP
LIDOCAIN IGE QN: 15 U/L — LOW (ref 22–51)
LIDOCAIN IGE QN: 59 U/L — HIGH (ref 22–51)
LYMPHOCYTES # BLD AUTO: 0.79 K/UL — LOW (ref 1–3.3)
LYMPHOCYTES # BLD AUTO: 1.07 K/UL — SIGNIFICANT CHANGE UP (ref 1–3.3)
LYMPHOCYTES # BLD AUTO: 1.64 K/UL — SIGNIFICANT CHANGE UP (ref 1–3.3)
LYMPHOCYTES # BLD AUTO: 1.72 K/UL — SIGNIFICANT CHANGE UP (ref 1–3.3)
LYMPHOCYTES # BLD AUTO: 1.86 K/UL — SIGNIFICANT CHANGE UP (ref 1–3.3)
LYMPHOCYTES # BLD AUTO: 11.5 % — LOW (ref 13–44)
LYMPHOCYTES # BLD AUTO: 12.7 % — LOW (ref 13–44)
LYMPHOCYTES # BLD AUTO: 15.4 % — SIGNIFICANT CHANGE UP (ref 13–44)
LYMPHOCYTES # BLD AUTO: 15.6 % — SIGNIFICANT CHANGE UP (ref 13–44)
LYMPHOCYTES # BLD AUTO: 16.7 % — SIGNIFICANT CHANGE UP (ref 13–44)
LYMPHOCYTES # BLD AUTO: 17.3 % — SIGNIFICANT CHANGE UP (ref 13–44)
LYMPHOCYTES # BLD AUTO: 18.4 % — SIGNIFICANT CHANGE UP (ref 13–44)
LYMPHOCYTES # BLD AUTO: 2.8 K/UL — SIGNIFICANT CHANGE UP (ref 1–3.3)
LYMPHOCYTES # BLD AUTO: 2.86 K/UL — SIGNIFICANT CHANGE UP (ref 1–3.3)
LYMPHOCYTES # BLD AUTO: 20.2 % — SIGNIFICANT CHANGE UP (ref 13–44)
LYMPHOCYTES # BLD AUTO: 26.3 % — SIGNIFICANT CHANGE UP (ref 13–44)
LYMPHOCYTES # BLD AUTO: 3.24 K/UL — SIGNIFICANT CHANGE UP (ref 1–3.3)
LYMPHOCYTES # BLD AUTO: 3.29 K/UL — SIGNIFICANT CHANGE UP (ref 1–3.3)
LYMPHOCYTES # BLD AUTO: 3.5 % — LOW (ref 13–44)
LYMPHOCYTES # BLD AUTO: 3.88 K/UL — HIGH (ref 1–3.3)
LYMPHOCYTES # BLD AUTO: 4.22 K/UL — HIGH (ref 1–3.3)
LYMPHOCYTES # BLD AUTO: 4.51 K/UL — HIGH (ref 1–3.3)
LYMPHOCYTES # BLD AUTO: 5.2 K/UL — HIGH (ref 1–3.3)
LYMPHOCYTES # BLD AUTO: 5.3 % — LOW (ref 13–44)
LYMPHOCYTES # BLD AUTO: 6.13 K/UL — HIGH (ref 1–3.3)
LYMPHOCYTES # BLD AUTO: 6.3 % — LOW (ref 13–44)
LYMPHOCYTES # BLD AUTO: 7 % — LOW (ref 13–44)
LYMPHOCYTES # BLD AUTO: 7 % — LOW (ref 13–44)
MAGNESIUM SERPL-MCNC: 1.4 MG/DL — LOW (ref 1.6–2.6)
MAGNESIUM SERPL-MCNC: 1.5 MG/DL — LOW (ref 1.6–2.6)
MAGNESIUM SERPL-MCNC: 1.6 MG/DL — SIGNIFICANT CHANGE UP (ref 1.6–2.6)
MAGNESIUM SERPL-MCNC: 1.6 MG/DL — SIGNIFICANT CHANGE UP (ref 1.6–2.6)
MAGNESIUM SERPL-MCNC: 1.7 MG/DL — SIGNIFICANT CHANGE UP (ref 1.6–2.6)
MAGNESIUM SERPL-MCNC: 1.9 MG/DL — SIGNIFICANT CHANGE UP (ref 1.6–2.6)
MAGNESIUM SERPL-MCNC: 1.9 MG/DL — SIGNIFICANT CHANGE UP (ref 1.6–2.6)
MAGNESIUM SERPL-MCNC: 2 MG/DL — SIGNIFICANT CHANGE UP (ref 1.6–2.6)
MAGNESIUM SERPL-MCNC: 2 MG/DL — SIGNIFICANT CHANGE UP (ref 1.6–2.6)
MAGNESIUM SERPL-MCNC: 2.1 MG/DL — SIGNIFICANT CHANGE UP (ref 1.6–2.6)
MAGNESIUM SERPL-MCNC: 2.1 MG/DL — SIGNIFICANT CHANGE UP (ref 1.8–2.6)
MAGNESIUM SERPL-MCNC: 2.2 MG/DL — SIGNIFICANT CHANGE UP (ref 1.6–2.6)
MAGNESIUM SERPL-MCNC: 2.2 MG/DL — SIGNIFICANT CHANGE UP (ref 1.6–2.6)
MAGNESIUM SERPL-MCNC: 2.2 MG/DL — SIGNIFICANT CHANGE UP (ref 1.8–2.6)
MAGNESIUM SERPL-MCNC: 2.3 MG/DL — SIGNIFICANT CHANGE UP (ref 1.8–2.6)
MANUAL SMEAR VERIFICATION: SIGNIFICANT CHANGE UP
MCHC RBC-ENTMCNC: 27.3 PG — SIGNIFICANT CHANGE UP (ref 27–34)
MCHC RBC-ENTMCNC: 27.4 PG — SIGNIFICANT CHANGE UP (ref 27–34)
MCHC RBC-ENTMCNC: 27.5 PG — SIGNIFICANT CHANGE UP (ref 27–34)
MCHC RBC-ENTMCNC: 27.5 PG — SIGNIFICANT CHANGE UP (ref 27–34)
MCHC RBC-ENTMCNC: 27.6 PG — SIGNIFICANT CHANGE UP (ref 27–34)
MCHC RBC-ENTMCNC: 27.6 PG — SIGNIFICANT CHANGE UP (ref 27–34)
MCHC RBC-ENTMCNC: 27.7 PG — SIGNIFICANT CHANGE UP (ref 27–34)
MCHC RBC-ENTMCNC: 27.7 PG — SIGNIFICANT CHANGE UP (ref 27–34)
MCHC RBC-ENTMCNC: 27.8 PG — SIGNIFICANT CHANGE UP (ref 27–34)
MCHC RBC-ENTMCNC: 27.8 PG — SIGNIFICANT CHANGE UP (ref 27–34)
MCHC RBC-ENTMCNC: 27.9 PG — SIGNIFICANT CHANGE UP (ref 27–34)
MCHC RBC-ENTMCNC: 27.9 PG — SIGNIFICANT CHANGE UP (ref 27–34)
MCHC RBC-ENTMCNC: 28 PG — SIGNIFICANT CHANGE UP (ref 27–34)
MCHC RBC-ENTMCNC: 28.1 PG — SIGNIFICANT CHANGE UP (ref 27–34)
MCHC RBC-ENTMCNC: 28.1 PG — SIGNIFICANT CHANGE UP (ref 27–34)
MCHC RBC-ENTMCNC: 30.7 GM/DL — LOW (ref 32–36)
MCHC RBC-ENTMCNC: 31.1 GM/DL — LOW (ref 32–36)
MCHC RBC-ENTMCNC: 31.7 GM/DL — LOW (ref 32–36)
MCHC RBC-ENTMCNC: 32.2 GM/DL — SIGNIFICANT CHANGE UP (ref 32–36)
MCHC RBC-ENTMCNC: 32.3 GM/DL — SIGNIFICANT CHANGE UP (ref 32–36)
MCHC RBC-ENTMCNC: 32.7 GM/DL — SIGNIFICANT CHANGE UP (ref 32–36)
MCHC RBC-ENTMCNC: 32.9 GM/DL — SIGNIFICANT CHANGE UP (ref 32–36)
MCHC RBC-ENTMCNC: 33 GM/DL — SIGNIFICANT CHANGE UP (ref 32–36)
MCHC RBC-ENTMCNC: 33.1 GM/DL — SIGNIFICANT CHANGE UP (ref 32–36)
MCHC RBC-ENTMCNC: 33.2 GM/DL — SIGNIFICANT CHANGE UP (ref 32–36)
MCHC RBC-ENTMCNC: 33.3 GM/DL — SIGNIFICANT CHANGE UP (ref 32–36)
MCHC RBC-ENTMCNC: 33.7 GM/DL — SIGNIFICANT CHANGE UP (ref 32–36)
MCHC RBC-ENTMCNC: 33.7 GM/DL — SIGNIFICANT CHANGE UP (ref 32–36)
MCV RBC AUTO: 82.7 FL — SIGNIFICANT CHANGE UP (ref 80–100)
MCV RBC AUTO: 83 FL — SIGNIFICANT CHANGE UP (ref 80–100)
MCV RBC AUTO: 83.3 FL — SIGNIFICANT CHANGE UP (ref 80–100)
MCV RBC AUTO: 83.4 FL — SIGNIFICANT CHANGE UP (ref 80–100)
MCV RBC AUTO: 83.8 FL — SIGNIFICANT CHANGE UP (ref 80–100)
MCV RBC AUTO: 84.2 FL — SIGNIFICANT CHANGE UP (ref 80–100)
MCV RBC AUTO: 84.4 FL — SIGNIFICANT CHANGE UP (ref 80–100)
MCV RBC AUTO: 84.8 FL — SIGNIFICANT CHANGE UP (ref 80–100)
MCV RBC AUTO: 85.1 FL — SIGNIFICANT CHANGE UP (ref 80–100)
MCV RBC AUTO: 85.4 FL — SIGNIFICANT CHANGE UP (ref 80–100)
MCV RBC AUTO: 85.7 FL — SIGNIFICANT CHANGE UP (ref 80–100)
MCV RBC AUTO: 85.8 FL — SIGNIFICANT CHANGE UP (ref 80–100)
MCV RBC AUTO: 85.9 FL — SIGNIFICANT CHANGE UP (ref 80–100)
MCV RBC AUTO: 86.4 FL — SIGNIFICANT CHANGE UP (ref 80–100)
MCV RBC AUTO: 87.8 FL — SIGNIFICANT CHANGE UP (ref 80–100)
MCV RBC AUTO: 89 FL — SIGNIFICANT CHANGE UP (ref 80–100)
MCV RBC AUTO: 89.6 FL — SIGNIFICANT CHANGE UP (ref 80–100)
METAMYELOCYTES # FLD: 0.9 % — HIGH (ref 0–0)
METAMYELOCYTES # FLD: 0.9 % — HIGH (ref 0–0)
METAMYELOCYTES # FLD: 6.1 % — HIGH (ref 0–0)
METHOD TYPE: SIGNIFICANT CHANGE UP
MICROCYTES BLD QL: SLIGHT — SIGNIFICANT CHANGE UP
MICROCYTES BLD QL: SLIGHT — SIGNIFICANT CHANGE UP
MONOCYTES # BLD AUTO: 0.69 K/UL — SIGNIFICANT CHANGE UP (ref 0–0.9)
MONOCYTES # BLD AUTO: 0.7 K/UL — SIGNIFICANT CHANGE UP (ref 0–0.9)
MONOCYTES # BLD AUTO: 0.8 K/UL — SIGNIFICANT CHANGE UP (ref 0–0.9)
MONOCYTES # BLD AUTO: 0.87 K/UL — SIGNIFICANT CHANGE UP (ref 0–0.9)
MONOCYTES # BLD AUTO: 0.87 K/UL — SIGNIFICANT CHANGE UP (ref 0–0.9)
MONOCYTES # BLD AUTO: 0.91 K/UL — HIGH (ref 0–0.9)
MONOCYTES # BLD AUTO: 0.99 K/UL — HIGH (ref 0–0.9)
MONOCYTES # BLD AUTO: 0.99 K/UL — HIGH (ref 0–0.9)
MONOCYTES # BLD AUTO: 1.07 K/UL — HIGH (ref 0–0.9)
MONOCYTES # BLD AUTO: 1.08 K/UL — HIGH (ref 0–0.9)
MONOCYTES # BLD AUTO: 1.4 K/UL — HIGH (ref 0–0.9)
MONOCYTES # BLD AUTO: 1.41 K/UL — HIGH (ref 0–0.9)
MONOCYTES # BLD AUTO: 1.91 K/UL — HIGH (ref 0–0.9)
MONOCYTES # BLD AUTO: 2.14 K/UL — HIGH (ref 0–0.9)
MONOCYTES NFR BLD AUTO: 3.1 % — SIGNIFICANT CHANGE UP (ref 2–14)
MONOCYTES NFR BLD AUTO: 3.5 % — SIGNIFICANT CHANGE UP (ref 2–14)
MONOCYTES NFR BLD AUTO: 4.1 % — SIGNIFICANT CHANGE UP (ref 2–14)
MONOCYTES NFR BLD AUTO: 4.4 % — SIGNIFICANT CHANGE UP (ref 2–14)
MONOCYTES NFR BLD AUTO: 4.4 % — SIGNIFICANT CHANGE UP (ref 2–14)
MONOCYTES NFR BLD AUTO: 4.7 % — SIGNIFICANT CHANGE UP (ref 2–14)
MONOCYTES NFR BLD AUTO: 4.9 % — SIGNIFICANT CHANGE UP (ref 2–14)
MONOCYTES NFR BLD AUTO: 5.1 % — SIGNIFICANT CHANGE UP (ref 2–14)
MONOCYTES NFR BLD AUTO: 5.2 % — SIGNIFICANT CHANGE UP (ref 2–14)
MONOCYTES NFR BLD AUTO: 5.3 % — SIGNIFICANT CHANGE UP (ref 2–14)
MONOCYTES NFR BLD AUTO: 5.4 % — SIGNIFICANT CHANGE UP (ref 2–14)
MONOCYTES NFR BLD AUTO: 8.8 % — SIGNIFICANT CHANGE UP (ref 2–14)
NEUTROPHILS # BLD AUTO: 11.99 K/UL — HIGH (ref 1.8–7.4)
NEUTROPHILS # BLD AUTO: 13.48 K/UL — HIGH (ref 1.8–7.4)
NEUTROPHILS # BLD AUTO: 15.33 K/UL — HIGH (ref 1.8–7.4)
NEUTROPHILS # BLD AUTO: 16.5 K/UL — HIGH (ref 1.8–7.4)
NEUTROPHILS # BLD AUTO: 18.05 K/UL — HIGH (ref 1.8–7.4)
NEUTROPHILS # BLD AUTO: 18.33 K/UL — HIGH (ref 1.8–7.4)
NEUTROPHILS # BLD AUTO: 18.37 K/UL — HIGH (ref 1.8–7.4)
NEUTROPHILS # BLD AUTO: 18.6 K/UL — HIGH (ref 1.8–7.4)
NEUTROPHILS # BLD AUTO: 19.9 K/UL — HIGH (ref 1.8–7.4)
NEUTROPHILS # BLD AUTO: 20.37 K/UL — HIGH (ref 1.8–7.4)
NEUTROPHILS # BLD AUTO: 21.3 K/UL — HIGH (ref 1.8–7.4)
NEUTROPHILS # BLD AUTO: 22.72 K/UL — HIGH (ref 1.8–7.4)
NEUTROPHILS # BLD AUTO: 22.8 K/UL — HIGH (ref 1.8–7.4)
NEUTROPHILS # BLD AUTO: 28.35 K/UL — HIGH (ref 1.8–7.4)
NEUTROPHILS NFR BLD AUTO: 47.4 % — SIGNIFICANT CHANGE UP (ref 43–77)
NEUTROPHILS NFR BLD AUTO: 69.6 % — SIGNIFICANT CHANGE UP (ref 43–77)
NEUTROPHILS NFR BLD AUTO: 73.6 % — SIGNIFICANT CHANGE UP (ref 43–77)
NEUTROPHILS NFR BLD AUTO: 73.7 % — SIGNIFICANT CHANGE UP (ref 43–77)
NEUTROPHILS NFR BLD AUTO: 75.2 % — SIGNIFICANT CHANGE UP (ref 43–77)
NEUTROPHILS NFR BLD AUTO: 76.1 % — SIGNIFICANT CHANGE UP (ref 43–77)
NEUTROPHILS NFR BLD AUTO: 76.6 % — SIGNIFICANT CHANGE UP (ref 43–77)
NEUTROPHILS NFR BLD AUTO: 78.4 % — HIGH (ref 43–77)
NEUTROPHILS NFR BLD AUTO: 80.4 % — HIGH (ref 43–77)
NEUTROPHILS NFR BLD AUTO: 85.1 % — HIGH (ref 43–77)
NEUTROPHILS NFR BLD AUTO: 86.8 % — HIGH (ref 43–77)
NEUTROPHILS NFR BLD AUTO: 87.4 % — HIGH (ref 43–77)
NEUTROPHILS NFR BLD AUTO: 89.5 % — HIGH (ref 43–77)
NEUTROPHILS NFR BLD AUTO: 90.3 % — HIGH (ref 43–77)
NEUTS BAND # BLD: 0.9 % — SIGNIFICANT CHANGE UP (ref 0–8)
NEUTS BAND # BLD: 1.7 % — SIGNIFICANT CHANGE UP (ref 0–8)
NEUTS BAND # BLD: 13.2 % — HIGH (ref 0–8)
NEUTS BAND # BLD: 3.5 % — SIGNIFICANT CHANGE UP (ref 0–8)
NEUTS BAND # BLD: 4.3 % — SIGNIFICANT CHANGE UP (ref 0–8)
NITRITE UR-MCNC: NEGATIVE — SIGNIFICANT CHANGE UP
NRBC # BLD: 1 /100 — HIGH (ref 0–0)
NRBC # BLD: 1 /100 — HIGH (ref 0–0)
OB PNL STL: NEGATIVE — SIGNIFICANT CHANGE UP
OB PNL STL: POSITIVE
ORGANISM # SPEC MICROSCOPIC CNT: SIGNIFICANT CHANGE UP
ORGANISM # SPEC MICROSCOPIC CNT: SIGNIFICANT CHANGE UP
OSMOLALITY SERPL: 335 MOSMOL/KG — HIGH (ref 280–301)
OSMOLALITY UR: 640 MOSM/KG — SIGNIFICANT CHANGE UP (ref 300–1000)
OVALOCYTES BLD QL SMEAR: SLIGHT — SIGNIFICANT CHANGE UP
PCO2 BLDV: 39 MMHG — LOW (ref 42–55)
PCO2 BLDV: 40 MMHG — LOW (ref 42–55)
PH BLDV: 7.27 — LOW (ref 7.32–7.43)
PH BLDV: 7.41 — SIGNIFICANT CHANGE UP (ref 7.32–7.43)
PH UR: 5 — SIGNIFICANT CHANGE UP (ref 5–8)
PH UR: 7 — SIGNIFICANT CHANGE UP (ref 5–8)
PH UR: 7 — SIGNIFICANT CHANGE UP (ref 5–8)
PHOSPHATE SERPL-MCNC: 1.5 MG/DL — LOW (ref 2.4–4.7)
PHOSPHATE SERPL-MCNC: 2 MG/DL — LOW (ref 2.4–4.7)
PHOSPHATE SERPL-MCNC: 2.1 MG/DL — LOW (ref 2.4–4.7)
PHOSPHATE SERPL-MCNC: 2.1 MG/DL — LOW (ref 2.4–4.7)
PHOSPHATE SERPL-MCNC: 2.2 MG/DL — LOW (ref 2.4–4.7)
PHOSPHATE SERPL-MCNC: 2.2 MG/DL — LOW (ref 2.4–4.7)
PHOSPHATE SERPL-MCNC: 2.3 MG/DL — LOW (ref 2.4–4.7)
PHOSPHATE SERPL-MCNC: 2.5 MG/DL — SIGNIFICANT CHANGE UP (ref 2.4–4.7)
PHOSPHATE SERPL-MCNC: 2.5 MG/DL — SIGNIFICANT CHANGE UP (ref 2.4–4.7)
PHOSPHATE SERPL-MCNC: 3.3 MG/DL — SIGNIFICANT CHANGE UP (ref 2.4–4.7)
PHOSPHATE SERPL-MCNC: 3.3 MG/DL — SIGNIFICANT CHANGE UP (ref 2.4–4.7)
PHOSPHATE SERPL-MCNC: 3.5 MG/DL — SIGNIFICANT CHANGE UP (ref 2.4–4.7)
PHOSPHATE SERPL-MCNC: 3.6 MG/DL — SIGNIFICANT CHANGE UP (ref 2.4–4.7)
PHOSPHATE SERPL-MCNC: 3.8 MG/DL — SIGNIFICANT CHANGE UP (ref 2.4–4.7)
PLAT MORPH BLD: NORMAL — SIGNIFICANT CHANGE UP
PLATELET # BLD AUTO: 151 K/UL — SIGNIFICANT CHANGE UP (ref 150–400)
PLATELET # BLD AUTO: 153 K/UL — SIGNIFICANT CHANGE UP (ref 150–400)
PLATELET # BLD AUTO: 161 K/UL — SIGNIFICANT CHANGE UP (ref 150–400)
PLATELET # BLD AUTO: 165 K/UL — SIGNIFICANT CHANGE UP (ref 150–400)
PLATELET # BLD AUTO: 177 K/UL — SIGNIFICANT CHANGE UP (ref 150–400)
PLATELET # BLD AUTO: 198 K/UL — SIGNIFICANT CHANGE UP (ref 150–400)
PLATELET # BLD AUTO: 200 K/UL — SIGNIFICANT CHANGE UP (ref 150–400)
PLATELET # BLD AUTO: 218 K/UL — SIGNIFICANT CHANGE UP (ref 150–400)
PLATELET # BLD AUTO: 221 K/UL — SIGNIFICANT CHANGE UP (ref 150–400)
PLATELET # BLD AUTO: 238 K/UL — SIGNIFICANT CHANGE UP (ref 150–400)
PLATELET # BLD AUTO: 245 K/UL — SIGNIFICANT CHANGE UP (ref 150–400)
PLATELET # BLD AUTO: 255 K/UL — SIGNIFICANT CHANGE UP (ref 150–400)
PLATELET # BLD AUTO: 258 K/UL — SIGNIFICANT CHANGE UP (ref 150–400)
PLATELET # BLD AUTO: 261 K/UL — SIGNIFICANT CHANGE UP (ref 150–400)
PLATELET # BLD AUTO: 280 K/UL — SIGNIFICANT CHANGE UP (ref 150–400)
PLATELET # BLD AUTO: 321 K/UL — SIGNIFICANT CHANGE UP (ref 150–400)
PLATELET # BLD AUTO: 400 K/UL — SIGNIFICANT CHANGE UP (ref 150–400)
PO2 BLDV: 57 MMHG — HIGH (ref 25–45)
PO2 BLDV: <42 MMHG — SIGNIFICANT CHANGE UP (ref 25–45)
POIKILOCYTOSIS BLD QL AUTO: SIGNIFICANT CHANGE UP
POIKILOCYTOSIS BLD QL AUTO: SLIGHT — SIGNIFICANT CHANGE UP
POLYCHROMASIA BLD QL SMEAR: SLIGHT — SIGNIFICANT CHANGE UP
POTASSIUM BLDV-SCNC: 3.6 MMOL/L — SIGNIFICANT CHANGE UP (ref 3.5–5.1)
POTASSIUM SERPL-MCNC: 2.9 MMOL/L — CRITICAL LOW (ref 3.5–5.3)
POTASSIUM SERPL-MCNC: 3 MMOL/L — LOW (ref 3.5–5.3)
POTASSIUM SERPL-MCNC: 3.1 MMOL/L — LOW (ref 3.5–5.3)
POTASSIUM SERPL-MCNC: 3.1 MMOL/L — LOW (ref 3.5–5.3)
POTASSIUM SERPL-MCNC: 3.2 MMOL/L — LOW (ref 3.5–5.3)
POTASSIUM SERPL-MCNC: 3.3 MMOL/L — LOW (ref 3.5–5.3)
POTASSIUM SERPL-MCNC: 3.4 MMOL/L — LOW (ref 3.5–5.3)
POTASSIUM SERPL-MCNC: 3.5 MMOL/L — SIGNIFICANT CHANGE UP (ref 3.5–5.3)
POTASSIUM SERPL-MCNC: 3.7 MMOL/L — SIGNIFICANT CHANGE UP (ref 3.5–5.3)
POTASSIUM SERPL-MCNC: 3.7 MMOL/L — SIGNIFICANT CHANGE UP (ref 3.5–5.3)
POTASSIUM SERPL-MCNC: 4 MMOL/L — SIGNIFICANT CHANGE UP (ref 3.5–5.3)
POTASSIUM SERPL-MCNC: 4 MMOL/L — SIGNIFICANT CHANGE UP (ref 3.5–5.3)
POTASSIUM SERPL-MCNC: 4.2 MMOL/L — SIGNIFICANT CHANGE UP (ref 3.5–5.3)
POTASSIUM SERPL-MCNC: 4.3 MMOL/L — SIGNIFICANT CHANGE UP (ref 3.5–5.3)
POTASSIUM SERPL-MCNC: 4.6 MMOL/L — SIGNIFICANT CHANGE UP (ref 3.5–5.3)
POTASSIUM SERPL-MCNC: 4.7 MMOL/L — SIGNIFICANT CHANGE UP (ref 3.5–5.3)
POTASSIUM SERPL-SCNC: 2.9 MMOL/L — CRITICAL LOW (ref 3.5–5.3)
POTASSIUM SERPL-SCNC: 3 MMOL/L — LOW (ref 3.5–5.3)
POTASSIUM SERPL-SCNC: 3.1 MMOL/L — LOW (ref 3.5–5.3)
POTASSIUM SERPL-SCNC: 3.1 MMOL/L — LOW (ref 3.5–5.3)
POTASSIUM SERPL-SCNC: 3.2 MMOL/L — LOW (ref 3.5–5.3)
POTASSIUM SERPL-SCNC: 3.3 MMOL/L — LOW (ref 3.5–5.3)
POTASSIUM SERPL-SCNC: 3.4 MMOL/L — LOW (ref 3.5–5.3)
POTASSIUM SERPL-SCNC: 3.5 MMOL/L — SIGNIFICANT CHANGE UP (ref 3.5–5.3)
POTASSIUM SERPL-SCNC: 3.7 MMOL/L — SIGNIFICANT CHANGE UP (ref 3.5–5.3)
POTASSIUM SERPL-SCNC: 3.7 MMOL/L — SIGNIFICANT CHANGE UP (ref 3.5–5.3)
POTASSIUM SERPL-SCNC: 4 MMOL/L — SIGNIFICANT CHANGE UP (ref 3.5–5.3)
POTASSIUM SERPL-SCNC: 4 MMOL/L — SIGNIFICANT CHANGE UP (ref 3.5–5.3)
POTASSIUM SERPL-SCNC: 4.2 MMOL/L — SIGNIFICANT CHANGE UP (ref 3.5–5.3)
POTASSIUM SERPL-SCNC: 4.3 MMOL/L — SIGNIFICANT CHANGE UP (ref 3.5–5.3)
POTASSIUM SERPL-SCNC: 4.6 MMOL/L — SIGNIFICANT CHANGE UP (ref 3.5–5.3)
POTASSIUM SERPL-SCNC: 4.7 MMOL/L — SIGNIFICANT CHANGE UP (ref 3.5–5.3)
PROT 24H UR-MRATE: 469 MG/24HR — HIGH (ref 50–100)
PROT SERPL-MCNC: 4.1 G/DL — LOW (ref 6.6–8.7)
PROT SERPL-MCNC: 4.2 G/DL — LOW (ref 6.6–8.7)
PROT SERPL-MCNC: 4.5 G/DL — LOW (ref 6.6–8.7)
PROT SERPL-MCNC: 5.9 G/DL — LOW (ref 6.6–8.7)
PROT SERPL-MCNC: 7.4 G/DL — SIGNIFICANT CHANGE UP (ref 6.6–8.7)
PROT UR-MCNC: 100
PROT UR-MCNC: 30 MG/DL
PROT UR-MCNC: 500 MG/DL
PROTHROM AB SERPL-ACNC: 14.4 SEC — HIGH (ref 10.6–13.6)
PROTHROM AB SERPL-ACNC: 15.9 SEC — HIGH (ref 10.6–13.6)
PROTHROM AB SERPL-ACNC: 16.3 SEC — HIGH (ref 10.6–13.6)
PROTHROM AB SERPL-ACNC: 17.2 SEC — HIGH (ref 10.6–13.6)
RAPID RVP RESULT: SIGNIFICANT CHANGE UP
RBC # BLD: 3.45 M/UL — LOW (ref 4.2–5.8)
RBC # BLD: 3.67 M/UL — LOW (ref 4.2–5.8)
RBC # BLD: 3.75 M/UL — LOW (ref 4.2–5.8)
RBC # BLD: 3.79 M/UL — LOW (ref 4.2–5.8)
RBC # BLD: 3.81 M/UL — LOW (ref 4.2–5.8)
RBC # BLD: 3.82 M/UL — LOW (ref 4.2–5.8)
RBC # BLD: 3.83 M/UL — LOW (ref 4.2–5.8)
RBC # BLD: 3.83 M/UL — LOW (ref 4.2–5.8)
RBC # BLD: 3.88 M/UL — LOW (ref 4.2–5.8)
RBC # BLD: 3.92 M/UL — LOW (ref 4.2–5.8)
RBC # BLD: 4.2 M/UL — SIGNIFICANT CHANGE UP (ref 4.2–5.8)
RBC # BLD: 4.31 M/UL — SIGNIFICANT CHANGE UP (ref 4.2–5.8)
RBC # BLD: 4.71 M/UL — SIGNIFICANT CHANGE UP (ref 4.2–5.8)
RBC # BLD: 5.16 M/UL — SIGNIFICANT CHANGE UP (ref 4.2–5.8)
RBC # BLD: 5.19 M/UL — SIGNIFICANT CHANGE UP (ref 4.2–5.8)
RBC # BLD: 5.39 M/UL — SIGNIFICANT CHANGE UP (ref 4.2–5.8)
RBC # BLD: 5.74 M/UL — SIGNIFICANT CHANGE UP (ref 4.2–5.8)
RBC # FLD: 13.2 % — SIGNIFICANT CHANGE UP (ref 10.3–14.5)
RBC # FLD: 13.4 % — SIGNIFICANT CHANGE UP (ref 10.3–14.5)
RBC # FLD: 13.7 % — SIGNIFICANT CHANGE UP (ref 10.3–14.5)
RBC # FLD: 13.8 % — SIGNIFICANT CHANGE UP (ref 10.3–14.5)
RBC # FLD: 14 % — SIGNIFICANT CHANGE UP (ref 10.3–14.5)
RBC # FLD: 14.2 % — SIGNIFICANT CHANGE UP (ref 10.3–14.5)
RBC # FLD: 14.2 % — SIGNIFICANT CHANGE UP (ref 10.3–14.5)
RBC # FLD: 14.3 % — SIGNIFICANT CHANGE UP (ref 10.3–14.5)
RBC # FLD: 14.4 % — SIGNIFICANT CHANGE UP (ref 10.3–14.5)
RBC # FLD: 14.5 % — SIGNIFICANT CHANGE UP (ref 10.3–14.5)
RBC # FLD: 14.6 % — HIGH (ref 10.3–14.5)
RBC BLD AUTO: ABNORMAL
RBC BLD AUTO: NORMAL — SIGNIFICANT CHANGE UP
RBC BLD AUTO: SIGNIFICANT CHANGE UP
RBC BLD AUTO: SIGNIFICANT CHANGE UP
RBC CASTS # UR COMP ASSIST: >50 /HPF (ref 0–4)
RBC CASTS # UR COMP ASSIST: >50 /HPF (ref 0–4)
RBC CASTS # UR COMP ASSIST: ABNORMAL /HPF (ref 0–4)
SAO2 % BLDV: 68.5 % — SIGNIFICANT CHANGE UP
SAO2 % BLDV: 87.2 % — SIGNIFICANT CHANGE UP
SARS-COV-2 RNA SPEC QL NAA+PROBE: SIGNIFICANT CHANGE UP
SCHISTOCYTES BLD QL AUTO: SLIGHT — SIGNIFICANT CHANGE UP
SMUDGE CELLS # BLD: PRESENT — SIGNIFICANT CHANGE UP
SODIUM SERPL-SCNC: 137 MMOL/L — SIGNIFICANT CHANGE UP (ref 135–145)
SODIUM SERPL-SCNC: 138 MMOL/L — SIGNIFICANT CHANGE UP (ref 135–145)
SODIUM SERPL-SCNC: 140 MMOL/L — SIGNIFICANT CHANGE UP (ref 135–145)
SODIUM SERPL-SCNC: 141 MMOL/L — SIGNIFICANT CHANGE UP (ref 135–145)
SODIUM SERPL-SCNC: 141 MMOL/L — SIGNIFICANT CHANGE UP (ref 135–145)
SODIUM SERPL-SCNC: 142 MMOL/L — SIGNIFICANT CHANGE UP (ref 135–145)
SODIUM SERPL-SCNC: 143 MMOL/L — SIGNIFICANT CHANGE UP (ref 135–145)
SODIUM SERPL-SCNC: 143 MMOL/L — SIGNIFICANT CHANGE UP (ref 135–145)
SODIUM SERPL-SCNC: 146 MMOL/L — HIGH (ref 135–145)
SODIUM SERPL-SCNC: 146 MMOL/L — HIGH (ref 135–145)
SODIUM SERPL-SCNC: 148 MMOL/L — HIGH (ref 135–145)
SODIUM SERPL-SCNC: 150 MMOL/L — HIGH (ref 135–145)
SODIUM SERPL-SCNC: 151 MMOL/L — HIGH (ref 135–145)
SODIUM SERPL-SCNC: 151 MMOL/L — HIGH (ref 135–145)
SODIUM SERPL-SCNC: 152 MMOL/L — HIGH (ref 135–145)
SODIUM SERPL-SCNC: 152 MMOL/L — HIGH (ref 135–145)
SODIUM SERPL-SCNC: 153 MMOL/L — HIGH (ref 135–145)
SODIUM SERPL-SCNC: 153 MMOL/L — HIGH (ref 135–145)
SODIUM UR-SCNC: 59 MMOL/L — SIGNIFICANT CHANGE UP
SP GR SPEC: 1.01 — SIGNIFICANT CHANGE UP (ref 1.01–1.02)
SP GR SPEC: 1.01 — SIGNIFICANT CHANGE UP (ref 1.01–1.02)
SP GR SPEC: 1.02 — SIGNIFICANT CHANGE UP (ref 1.01–1.02)
SPECIMEN SOURCE: SIGNIFICANT CHANGE UP
SURGICAL PATHOLOGY STUDY: SIGNIFICANT CHANGE UP
TARGETS BLD QL SMEAR: SLIGHT — SIGNIFICANT CHANGE UP
TARGETS BLD QL SMEAR: SLIGHT — SIGNIFICANT CHANGE UP
TOTAL VOLUME - 24 HOUR: 625 ML — SIGNIFICANT CHANGE UP
TOTAL VOLUME - 24 HOUR: 625 ML — SIGNIFICANT CHANGE UP
TOXIC GRANULES BLD QL SMEAR: PRESENT — SIGNIFICANT CHANGE UP
TROPONIN T SERPL-MCNC: 0.01 NG/ML — SIGNIFICANT CHANGE UP (ref 0–0.06)
TROPONIN T SERPL-MCNC: 0.02 NG/ML — SIGNIFICANT CHANGE UP (ref 0–0.06)
TROPONIN T SERPL-MCNC: 0.03 NG/ML — SIGNIFICANT CHANGE UP (ref 0–0.06)
TROPONIN T SERPL-MCNC: 0.04 NG/ML — SIGNIFICANT CHANGE UP (ref 0–0.06)
URINE CREATININE CALCULATION: 0.6 G/24 HR — LOW (ref 1–2)
URINE CREATININE CALCULATION: 0.6 G/24 HR — LOW (ref 1–2)
UROBILINOGEN FLD QL: NEGATIVE MG/DL — SIGNIFICANT CHANGE UP
VARIANT LYMPHS # BLD: 1.7 % — SIGNIFICANT CHANGE UP (ref 0–6)
VARIANT LYMPHS # BLD: 1.8 % — SIGNIFICANT CHANGE UP (ref 0–6)
VARIANT LYMPHS # BLD: 2.6 % — SIGNIFICANT CHANGE UP (ref 0–6)
VARIANT LYMPHS # BLD: 7 % — HIGH (ref 0–6)
WBC # BLD: 17.9 K/UL — HIGH (ref 3.8–10.5)
WBC # BLD: 19.77 K/UL — HIGH (ref 3.8–10.5)
WBC # BLD: 19.79 K/UL — HIGH (ref 3.8–10.5)
WBC # BLD: 20.1 K/UL — HIGH (ref 3.8–10.5)
WBC # BLD: 20.87 K/UL — HIGH (ref 3.8–10.5)
WBC # BLD: 21.06 K/UL — HIGH (ref 3.8–10.5)
WBC # BLD: 22.42 K/UL — HIGH (ref 3.8–10.5)
WBC # BLD: 22.44 K/UL — HIGH (ref 3.8–10.5)
WBC # BLD: 22.56 K/UL — HIGH (ref 3.8–10.5)
WBC # BLD: 24.41 K/UL — HIGH (ref 3.8–10.5)
WBC # BLD: 24.54 K/UL — HIGH (ref 3.8–10.5)
WBC # BLD: 24.86 K/UL — HIGH (ref 3.8–10.5)
WBC # BLD: 25.99 K/UL — HIGH (ref 3.8–10.5)
WBC # BLD: 26.01 K/UL — HIGH (ref 3.8–10.5)
WBC # BLD: 26.51 K/UL — HIGH (ref 3.8–10.5)
WBC # BLD: 35.86 K/UL — HIGH (ref 3.8–10.5)
WBC # BLD: 36.72 K/UL — HIGH (ref 3.8–10.5)
WBC # FLD AUTO: 17.9 K/UL — HIGH (ref 3.8–10.5)
WBC # FLD AUTO: 19.77 K/UL — HIGH (ref 3.8–10.5)
WBC # FLD AUTO: 19.79 K/UL — HIGH (ref 3.8–10.5)
WBC # FLD AUTO: 20.1 K/UL — HIGH (ref 3.8–10.5)
WBC # FLD AUTO: 20.87 K/UL — HIGH (ref 3.8–10.5)
WBC # FLD AUTO: 21.06 K/UL — HIGH (ref 3.8–10.5)
WBC # FLD AUTO: 22.42 K/UL — HIGH (ref 3.8–10.5)
WBC # FLD AUTO: 22.44 K/UL — HIGH (ref 3.8–10.5)
WBC # FLD AUTO: 22.56 K/UL — HIGH (ref 3.8–10.5)
WBC # FLD AUTO: 24.41 K/UL — HIGH (ref 3.8–10.5)
WBC # FLD AUTO: 24.54 K/UL — HIGH (ref 3.8–10.5)
WBC # FLD AUTO: 24.86 K/UL — HIGH (ref 3.8–10.5)
WBC # FLD AUTO: 25.99 K/UL — HIGH (ref 3.8–10.5)
WBC # FLD AUTO: 26.01 K/UL — HIGH (ref 3.8–10.5)
WBC # FLD AUTO: 26.51 K/UL — HIGH (ref 3.8–10.5)
WBC # FLD AUTO: 35.86 K/UL — HIGH (ref 3.8–10.5)
WBC # FLD AUTO: 36.72 K/UL — HIGH (ref 3.8–10.5)
WBC UR QL: >50 /HPF (ref 0–5)
WBC UR QL: SIGNIFICANT CHANGE UP
WBC UR QL: SIGNIFICANT CHANGE UP

## 2022-01-01 PROCEDURE — 87070 CULTURE OTHR SPECIMN AEROBIC: CPT

## 2022-01-01 PROCEDURE — 31500 INSERT EMERGENCY AIRWAY: CPT | Mod: 78

## 2022-01-01 PROCEDURE — 92610 EVALUATE SWALLOWING FUNCTION: CPT

## 2022-01-01 PROCEDURE — 36415 COLL VENOUS BLD VENIPUNCTURE: CPT

## 2022-01-01 PROCEDURE — 76770 US EXAM ABDO BACK WALL COMP: CPT

## 2022-01-01 PROCEDURE — 88307 TISSUE EXAM BY PATHOLOGIST: CPT | Mod: 26

## 2022-01-01 PROCEDURE — 83735 ASSAY OF MAGNESIUM: CPT

## 2022-01-01 PROCEDURE — 0225U NFCT DS DNA&RNA 21 SARSCOV2: CPT

## 2022-01-01 PROCEDURE — 82803 BLOOD GASES ANY COMBINATION: CPT

## 2022-01-01 PROCEDURE — P9016: CPT

## 2022-01-01 PROCEDURE — 71046 X-RAY EXAM CHEST 2 VIEWS: CPT | Mod: 26

## 2022-01-01 PROCEDURE — 83690 ASSAY OF LIPASE: CPT

## 2022-01-01 PROCEDURE — 82570 ASSAY OF URINE CREATININE: CPT

## 2022-01-01 PROCEDURE — 87040 BLOOD CULTURE FOR BACTERIA: CPT

## 2022-01-01 PROCEDURE — 99291 CRITICAL CARE FIRST HOUR: CPT

## 2022-01-01 PROCEDURE — 99223 1ST HOSP IP/OBS HIGH 75: CPT | Mod: 57,25

## 2022-01-01 PROCEDURE — 70450 CT HEAD/BRAIN W/O DYE: CPT | Mod: 26

## 2022-01-01 PROCEDURE — 80076 HEPATIC FUNCTION PANEL: CPT

## 2022-01-01 PROCEDURE — 36620 INSERTION CATHETER ARTERY: CPT

## 2022-01-01 PROCEDURE — 44120 REMOVAL OF SMALL INTESTINE: CPT | Mod: GC

## 2022-01-01 PROCEDURE — 71045 X-RAY EXAM CHEST 1 VIEW: CPT | Mod: 26

## 2022-01-01 PROCEDURE — 74176 CT ABD & PELVIS W/O CONTRAST: CPT | Mod: 26,MA

## 2022-01-01 PROCEDURE — 71046 X-RAY EXAM CHEST 2 VIEWS: CPT

## 2022-01-01 PROCEDURE — 97163 PT EVAL HIGH COMPLEX 45 MIN: CPT

## 2022-01-01 PROCEDURE — C1889: CPT

## 2022-01-01 PROCEDURE — 99024 POSTOP FOLLOW-UP VISIT: CPT

## 2022-01-01 PROCEDURE — 80053 COMPREHEN METABOLIC PANEL: CPT

## 2022-01-01 PROCEDURE — 82962 GLUCOSE BLOOD TEST: CPT

## 2022-01-01 PROCEDURE — 36556 INSERT NON-TUNNEL CV CATH: CPT | Mod: LT

## 2022-01-01 PROCEDURE — 99285 EMERGENCY DEPT VISIT HI MDM: CPT

## 2022-01-01 PROCEDURE — 96374 THER/PROPH/DIAG INJ IV PUSH: CPT

## 2022-01-01 PROCEDURE — 71045 X-RAY EXAM CHEST 1 VIEW: CPT | Mod: 26,77

## 2022-01-01 PROCEDURE — 93308 TTE F-UP OR LMTD: CPT | Mod: 26

## 2022-01-01 PROCEDURE — 94002 VENT MGMT INPAT INIT DAY: CPT

## 2022-01-01 PROCEDURE — U0003: CPT

## 2022-01-01 PROCEDURE — 83036 HEMOGLOBIN GLYCOSYLATED A1C: CPT

## 2022-01-01 PROCEDURE — 94760 N-INVAS EAR/PLS OXIMETRY 1: CPT

## 2022-01-01 PROCEDURE — 71250 CT THORAX DX C-: CPT | Mod: 26,MA

## 2022-01-01 PROCEDURE — 74018 RADEX ABDOMEN 1 VIEW: CPT | Mod: 26

## 2022-01-01 PROCEDURE — 93005 ELECTROCARDIOGRAM TRACING: CPT

## 2022-01-01 PROCEDURE — 82272 OCCULT BLD FECES 1-3 TESTS: CPT

## 2022-01-01 PROCEDURE — 80048 BASIC METABOLIC PNL TOTAL CA: CPT

## 2022-01-01 PROCEDURE — 83930 ASSAY OF BLOOD OSMOLALITY: CPT

## 2022-01-01 PROCEDURE — 84484 ASSAY OF TROPONIN QUANT: CPT

## 2022-01-01 PROCEDURE — 74176 CT ABD & PELVIS W/O CONTRAST: CPT | Mod: 26

## 2022-01-01 PROCEDURE — 82009 KETONE BODYS QUAL: CPT

## 2022-01-01 PROCEDURE — 84156 ASSAY OF PROTEIN URINE: CPT

## 2022-01-01 PROCEDURE — 99291 CRITICAL CARE FIRST HOUR: CPT | Mod: 25,24

## 2022-01-01 PROCEDURE — 85018 HEMOGLOBIN: CPT

## 2022-01-01 PROCEDURE — 81001 URINALYSIS AUTO W/SCOPE: CPT

## 2022-01-01 PROCEDURE — 86850 RBC ANTIBODY SCREEN: CPT

## 2022-01-01 PROCEDURE — P9045: CPT

## 2022-01-01 PROCEDURE — 88307 TISSUE EXAM BY PATHOLOGIST: CPT

## 2022-01-01 PROCEDURE — 84300 ASSAY OF URINE SODIUM: CPT

## 2022-01-01 PROCEDURE — 96375 TX/PRO/DX INJ NEW DRUG ADDON: CPT

## 2022-01-01 PROCEDURE — 93306 TTE W/DOPPLER COMPLETE: CPT | Mod: 26

## 2022-01-01 PROCEDURE — 70450 CT HEAD/BRAIN W/O DYE: CPT

## 2022-01-01 PROCEDURE — 82947 ASSAY GLUCOSE BLOOD QUANT: CPT

## 2022-01-01 PROCEDURE — 94003 VENT MGMT INPAT SUBQ DAY: CPT

## 2022-01-01 PROCEDURE — 99284 EMERGENCY DEPT VISIT MOD MDM: CPT | Mod: 25

## 2022-01-01 PROCEDURE — 87205 SMEAR GRAM STAIN: CPT

## 2022-01-01 PROCEDURE — 74018 RADEX ABDOMEN 1 VIEW: CPT

## 2022-01-01 PROCEDURE — 82550 ASSAY OF CK (CPK): CPT

## 2022-01-01 PROCEDURE — 82436 ASSAY OF URINE CHLORIDE: CPT

## 2022-01-01 PROCEDURE — 97530 THERAPEUTIC ACTIVITIES: CPT

## 2022-01-01 PROCEDURE — 74176 CT ABD & PELVIS W/O CONTRAST: CPT | Mod: MA

## 2022-01-01 PROCEDURE — 99232 SBSQ HOSP IP/OBS MODERATE 35: CPT | Mod: 24

## 2022-01-01 PROCEDURE — 85730 THROMBOPLASTIN TIME PARTIAL: CPT

## 2022-01-01 PROCEDURE — 93010 ELECTROCARDIOGRAM REPORT: CPT

## 2022-01-01 PROCEDURE — 84100 ASSAY OF PHOSPHORUS: CPT

## 2022-01-01 PROCEDURE — 99223 1ST HOSP IP/OBS HIGH 75: CPT

## 2022-01-01 PROCEDURE — 85027 COMPLETE CBC AUTOMATED: CPT

## 2022-01-01 PROCEDURE — 87077 CULTURE AEROBIC IDENTIFY: CPT

## 2022-01-01 PROCEDURE — U0005: CPT

## 2022-01-01 PROCEDURE — 85610 PROTHROMBIN TIME: CPT

## 2022-01-01 PROCEDURE — 86901 BLOOD TYPING SEROLOGIC RH(D): CPT

## 2022-01-01 PROCEDURE — 84295 ASSAY OF SERUM SODIUM: CPT

## 2022-01-01 PROCEDURE — 76937 US GUIDE VASCULAR ACCESS: CPT | Mod: 26

## 2022-01-01 PROCEDURE — 87150 DNA/RNA AMPLIFIED PROBE: CPT

## 2022-01-01 PROCEDURE — 85025 COMPLETE CBC W/AUTO DIFF WBC: CPT

## 2022-01-01 PROCEDURE — 71045 X-RAY EXAM CHEST 1 VIEW: CPT

## 2022-01-01 PROCEDURE — 99291 CRITICAL CARE FIRST HOUR: CPT | Mod: 25

## 2022-01-01 PROCEDURE — 82330 ASSAY OF CALCIUM: CPT

## 2022-01-01 PROCEDURE — 85014 HEMATOCRIT: CPT

## 2022-01-01 PROCEDURE — 99285 EMERGENCY DEPT VISIT HI MDM: CPT | Mod: 25

## 2022-01-01 PROCEDURE — 87075 CULTR BACTERIA EXCEPT BLOOD: CPT

## 2022-01-01 PROCEDURE — 87186 SC STD MICRODIL/AGAR DIL: CPT

## 2022-01-01 PROCEDURE — 34151 REMOVAL OF ARTERY CLOT: CPT

## 2022-01-01 PROCEDURE — 76770 US EXAM ABDO BACK WALL COMP: CPT | Mod: 26

## 2022-01-01 PROCEDURE — 83605 ASSAY OF LACTIC ACID: CPT

## 2022-01-01 PROCEDURE — 71045 X-RAY EXAM CHEST 1 VIEW: CPT | Mod: 26,76

## 2022-01-01 PROCEDURE — 93306 TTE W/DOPPLER COMPLETE: CPT

## 2022-01-01 PROCEDURE — 94640 AIRWAY INHALATION TREATMENT: CPT

## 2022-01-01 PROCEDURE — 36556 INSERT NON-TUNNEL CV CATH: CPT

## 2022-01-01 PROCEDURE — 86900 BLOOD TYPING SEROLOGIC ABO: CPT

## 2022-01-01 PROCEDURE — 71250 CT THORAX DX C-: CPT | Mod: MA

## 2022-01-01 PROCEDURE — 83935 ASSAY OF URINE OSMOLALITY: CPT

## 2022-01-01 PROCEDURE — 86923 COMPATIBILITY TEST ELECTRIC: CPT

## 2022-01-01 PROCEDURE — 92526 ORAL FUNCTION THERAPY: CPT

## 2022-01-01 PROCEDURE — 84132 ASSAY OF SERUM POTASSIUM: CPT

## 2022-01-01 PROCEDURE — 99233 SBSQ HOSP IP/OBS HIGH 50: CPT

## 2022-01-01 PROCEDURE — 97606 NEG PRS WND THER DME>50 SQCM: CPT | Mod: GC

## 2022-01-01 PROCEDURE — 82435 ASSAY OF BLOOD CHLORIDE: CPT

## 2022-01-01 DEVICE — STAPLER COVIDIEN TA 90 GREEN: Type: IMPLANTABLE DEVICE | Status: FUNCTIONAL

## 2022-01-01 DEVICE — CLIP APPLIER COVIDIEN SURGICLIP III 9" SM: Type: IMPLANTABLE DEVICE | Status: FUNCTIONAL

## 2022-01-01 DEVICE — CLIP APPLIER COVIDIEN ENDOCLIP III 5MM: Type: IMPLANTABLE DEVICE | Status: FUNCTIONAL

## 2022-01-01 DEVICE — CLIP APPLIER COVIDIEN SURGICLIP 11.5" MEDIUM: Type: IMPLANTABLE DEVICE | Status: FUNCTIONAL

## 2022-01-01 DEVICE — STAPLER COVIDIEN TRI-STAPLE 45MM PURPLE RELOAD: Type: IMPLANTABLE DEVICE | Status: FUNCTIONAL

## 2022-01-01 DEVICE — STAPLER COVIDIEN TA 60 BLUE: Type: IMPLANTABLE DEVICE | Status: FUNCTIONAL

## 2022-01-01 DEVICE — STAPLER COVIDIEN TRI-STAPLE 60MM PURPLE RELOAD: Type: IMPLANTABLE DEVICE | Status: FUNCTIONAL

## 2022-01-01 DEVICE — STAPLER COVIDIEN TA 90 BLUE: Type: IMPLANTABLE DEVICE | Status: FUNCTIONAL

## 2022-01-01 DEVICE — STAPLER COVIDIEN ENDO GIA 80-3.8MM BLUE RELOAD: Type: IMPLANTABLE DEVICE | Status: FUNCTIONAL

## 2022-01-01 DEVICE — STAPLER COVIDIEN ENDO GIA 80-3.8MM BLUE: Type: IMPLANTABLE DEVICE | Status: FUNCTIONAL

## 2022-01-01 DEVICE — STAPLER COVIDIEN TRI-STAPLE 45MM TAN RELOAD: Type: IMPLANTABLE DEVICE | Status: FUNCTIONAL

## 2022-01-01 DEVICE — SPONGE HSTAT SURGICEL FIBRILLAR 2X4": Type: IMPLANTABLE DEVICE | Status: FUNCTIONAL

## 2022-01-01 RX ORDER — VANCOMYCIN HCL 1 G
1000 VIAL (EA) INTRAVENOUS ONCE
Refills: 0 | Status: COMPLETED | OUTPATIENT
Start: 2022-01-01 | End: 2022-01-01

## 2022-01-01 RX ORDER — FENTANYL CITRATE 50 UG/ML
50 INJECTION INTRAVENOUS ONCE
Refills: 0 | Status: DISCONTINUED | OUTPATIENT
Start: 2022-01-01 | End: 2022-01-01

## 2022-01-01 RX ORDER — SODIUM CHLORIDE 9 MG/ML
1000 INJECTION, SOLUTION INTRAVENOUS ONCE
Refills: 0 | Status: COMPLETED | OUTPATIENT
Start: 2022-01-01 | End: 2022-01-01

## 2022-01-01 RX ORDER — MIDAZOLAM HYDROCHLORIDE 1 MG/ML
2 INJECTION, SOLUTION INTRAMUSCULAR; INTRAVENOUS ONCE
Refills: 0 | Status: DISCONTINUED | OUTPATIENT
Start: 2022-01-01 | End: 2022-01-01

## 2022-01-01 RX ORDER — TAMSULOSIN HYDROCHLORIDE 0.4 MG/1
0.4 CAPSULE ORAL AT BEDTIME
Refills: 0 | Status: DISCONTINUED | OUTPATIENT
Start: 2022-01-01 | End: 2022-01-01

## 2022-01-01 RX ORDER — CALCIUM GLUCONATE 100 MG/ML
2 VIAL (ML) INTRAVENOUS ONCE
Refills: 0 | Status: COMPLETED | OUTPATIENT
Start: 2022-01-01 | End: 2022-01-01

## 2022-01-01 RX ORDER — POTASSIUM CHLORIDE 20 MEQ
40 PACKET (EA) ORAL EVERY 4 HOURS
Refills: 0 | Status: COMPLETED | OUTPATIENT
Start: 2022-01-01 | End: 2022-01-01

## 2022-01-01 RX ORDER — HYDROMORPHONE HYDROCHLORIDE 2 MG/ML
0.5 INJECTION INTRAMUSCULAR; INTRAVENOUS; SUBCUTANEOUS ONCE
Refills: 0 | Status: DISCONTINUED | OUTPATIENT
Start: 2022-01-01 | End: 2022-01-01

## 2022-01-01 RX ORDER — INSULIN LISPRO 100/ML
2 VIAL (ML) SUBCUTANEOUS ONCE
Refills: 0 | Status: COMPLETED | OUTPATIENT
Start: 2022-01-01 | End: 2022-01-01

## 2022-01-01 RX ORDER — ACETAMINOPHEN 500 MG
1000 TABLET ORAL ONCE
Refills: 0 | Status: COMPLETED | OUTPATIENT
Start: 2022-01-01 | End: 2022-01-01

## 2022-01-01 RX ORDER — POTASSIUM PHOSPHATE, MONOBASIC POTASSIUM PHOSPHATE, DIBASIC 236; 224 MG/ML; MG/ML
30 INJECTION, SOLUTION INTRAVENOUS ONCE
Refills: 0 | Status: COMPLETED | OUTPATIENT
Start: 2022-01-01 | End: 2022-01-01

## 2022-01-01 RX ORDER — SODIUM CHLORIDE 9 MG/ML
1000 INJECTION, SOLUTION INTRAVENOUS
Refills: 0 | Status: DISCONTINUED | OUTPATIENT
Start: 2022-01-01 | End: 2022-01-01

## 2022-01-01 RX ORDER — SODIUM CHLORIDE 9 MG/ML
1000 INJECTION INTRAMUSCULAR; INTRAVENOUS; SUBCUTANEOUS ONCE
Refills: 0 | Status: COMPLETED | OUTPATIENT
Start: 2022-01-01 | End: 2022-01-01

## 2022-01-01 RX ORDER — ATENOLOL 25 MG/1
25 TABLET ORAL DAILY
Refills: 0 | Status: DISCONTINUED | OUTPATIENT
Start: 2022-01-01 | End: 2022-01-01

## 2022-01-01 RX ORDER — OMEPRAZOLE 10 MG/1
1 CAPSULE, DELAYED RELEASE ORAL
Qty: 0 | Refills: 0 | DISCHARGE

## 2022-01-01 RX ORDER — ACETAMINOPHEN 500 MG
650 TABLET ORAL EVERY 6 HOURS
Refills: 0 | Status: DISCONTINUED | OUTPATIENT
Start: 2022-01-01 | End: 2022-01-01

## 2022-01-01 RX ORDER — METOPROLOL TARTRATE 50 MG
5 TABLET ORAL ONCE
Refills: 0 | Status: COMPLETED | OUTPATIENT
Start: 2022-01-01 | End: 2022-01-01

## 2022-01-01 RX ORDER — ACETAMINOPHEN 500 MG
650 TABLET ORAL ONCE
Refills: 0 | Status: COMPLETED | OUTPATIENT
Start: 2022-01-01 | End: 2022-01-01

## 2022-01-01 RX ORDER — SODIUM CHLORIDE 9 MG/ML
500 INJECTION, SOLUTION INTRAVENOUS
Refills: 0 | Status: DISCONTINUED | OUTPATIENT
Start: 2022-01-01 | End: 2022-01-01

## 2022-01-01 RX ORDER — FENTANYL CITRATE 50 UG/ML
1 INJECTION INTRAVENOUS
Qty: 2500 | Refills: 0 | Status: DISCONTINUED | OUTPATIENT
Start: 2022-01-01 | End: 2022-01-01

## 2022-01-01 RX ORDER — FENTANYL CITRATE 50 UG/ML
3 INJECTION INTRAVENOUS
Qty: 2500 | Refills: 0 | Status: DISCONTINUED | OUTPATIENT
Start: 2022-01-01 | End: 2022-01-01

## 2022-01-01 RX ORDER — TAMSULOSIN HYDROCHLORIDE 0.4 MG/1
1 CAPSULE ORAL
Qty: 0 | Refills: 0 | DISCHARGE

## 2022-01-01 RX ORDER — CHLORHEXIDINE GLUCONATE 213 G/1000ML
1 SOLUTION TOPICAL DAILY
Refills: 0 | Status: DISCONTINUED | OUTPATIENT
Start: 2022-01-01 | End: 2022-01-01

## 2022-01-01 RX ORDER — SENNA PLUS 8.6 MG/1
15 TABLET ORAL AT BEDTIME
Refills: 0 | Status: DISCONTINUED | OUTPATIENT
Start: 2022-01-01 | End: 2022-01-01

## 2022-01-01 RX ORDER — GLUCAGON INJECTION, SOLUTION 0.5 MG/.1ML
1 INJECTION, SOLUTION SUBCUTANEOUS ONCE
Refills: 0 | Status: DISCONTINUED | OUTPATIENT
Start: 2022-01-01 | End: 2022-01-01

## 2022-01-01 RX ORDER — ROBINUL 0.2 MG/ML
0.2 INJECTION INTRAMUSCULAR; INTRAVENOUS EVERY 6 HOURS
Refills: 0 | Status: DISCONTINUED | OUTPATIENT
Start: 2022-01-01 | End: 2022-01-01

## 2022-01-01 RX ORDER — CHLORHEXIDINE GLUCONATE 213 G/1000ML
1 SOLUTION TOPICAL
Refills: 0 | Status: DISCONTINUED | OUTPATIENT
Start: 2022-01-01 | End: 2022-01-01

## 2022-01-01 RX ORDER — MORPHINE SULFATE 50 MG/1
4 CAPSULE, EXTENDED RELEASE ORAL EVERY 4 HOURS
Refills: 0 | Status: DISCONTINUED | OUTPATIENT
Start: 2022-01-01 | End: 2022-01-01

## 2022-01-01 RX ORDER — SODIUM CHLORIDE 9 MG/ML
500 INJECTION, SOLUTION INTRAVENOUS ONCE
Refills: 0 | Status: COMPLETED | OUTPATIENT
Start: 2022-01-01 | End: 2022-01-01

## 2022-01-01 RX ORDER — POTASSIUM CHLORIDE 20 MEQ
20 PACKET (EA) ORAL EVERY 4 HOURS
Refills: 0 | Status: COMPLETED | OUTPATIENT
Start: 2022-01-01 | End: 2022-01-01

## 2022-01-01 RX ORDER — PANTOPRAZOLE SODIUM 20 MG/1
80 TABLET, DELAYED RELEASE ORAL ONCE
Refills: 0 | Status: COMPLETED | OUTPATIENT
Start: 2022-01-01 | End: 2022-01-01

## 2022-01-01 RX ORDER — PIPERACILLIN AND TAZOBACTAM 4; .5 G/20ML; G/20ML
3.38 INJECTION, POWDER, LYOPHILIZED, FOR SOLUTION INTRAVENOUS EVERY 8 HOURS
Refills: 0 | Status: DISCONTINUED | OUTPATIENT
Start: 2022-01-01 | End: 2022-01-01

## 2022-01-01 RX ORDER — MAGNESIUM SULFATE 500 MG/ML
2 VIAL (ML) INJECTION ONCE
Refills: 0 | Status: COMPLETED | OUTPATIENT
Start: 2022-01-01 | End: 2022-01-01

## 2022-01-01 RX ORDER — PANTOPRAZOLE SODIUM 20 MG/1
40 TABLET, DELAYED RELEASE ORAL DAILY
Refills: 0 | Status: DISCONTINUED | OUTPATIENT
Start: 2022-01-01 | End: 2022-01-01

## 2022-01-01 RX ORDER — FENTANYL CITRATE 50 UG/ML
25 INJECTION INTRAVENOUS ONCE
Refills: 0 | Status: DISCONTINUED | OUTPATIENT
Start: 2022-01-01 | End: 2022-01-01

## 2022-01-01 RX ORDER — ALBUMIN HUMAN 25 %
250 VIAL (ML) INTRAVENOUS ONCE
Refills: 0 | Status: COMPLETED | OUTPATIENT
Start: 2022-01-01 | End: 2022-01-01

## 2022-01-01 RX ORDER — DEXMEDETOMIDINE HYDROCHLORIDE IN 0.9% SODIUM CHLORIDE 4 UG/ML
0.2 INJECTION INTRAVENOUS
Qty: 200 | Refills: 0 | Status: DISCONTINUED | OUTPATIENT
Start: 2022-01-01 | End: 2022-01-01

## 2022-01-01 RX ORDER — MORPHINE SULFATE 50 MG/1
2 CAPSULE, EXTENDED RELEASE ORAL EVERY 4 HOURS
Refills: 0 | Status: DISCONTINUED | OUTPATIENT
Start: 2022-01-01 | End: 2022-01-01

## 2022-01-01 RX ORDER — INSULIN LISPRO 100/ML
VIAL (ML) SUBCUTANEOUS EVERY 4 HOURS
Refills: 0 | Status: DISCONTINUED | OUTPATIENT
Start: 2022-01-01 | End: 2022-01-01

## 2022-01-01 RX ORDER — SODIUM,POTASSIUM PHOSPHATES 278-250MG
2 POWDER IN PACKET (EA) ORAL EVERY 4 HOURS
Refills: 0 | Status: COMPLETED | OUTPATIENT
Start: 2022-01-01 | End: 2022-01-01

## 2022-01-01 RX ORDER — ATORVASTATIN CALCIUM 80 MG/1
1 TABLET, FILM COATED ORAL
Qty: 0 | Refills: 0 | DISCHARGE

## 2022-01-01 RX ORDER — MIDAZOLAM HYDROCHLORIDE 1 MG/ML
1 INJECTION, SOLUTION INTRAMUSCULAR; INTRAVENOUS ONCE
Refills: 0 | Status: DISCONTINUED | OUTPATIENT
Start: 2022-01-01 | End: 2022-01-01

## 2022-01-01 RX ORDER — CALCIUM GLUCONATE 100 MG/ML
2 VIAL (ML) INTRAVENOUS
Refills: 0 | Status: COMPLETED | OUTPATIENT
Start: 2022-01-01 | End: 2022-01-01

## 2022-01-01 RX ORDER — MORPHINE SULFATE 50 MG/1
2 CAPSULE, EXTENDED RELEASE ORAL ONCE
Refills: 0 | Status: DISCONTINUED | OUTPATIENT
Start: 2022-01-01 | End: 2022-01-01

## 2022-01-01 RX ORDER — ATORVASTATIN CALCIUM 80 MG/1
40 TABLET, FILM COATED ORAL AT BEDTIME
Refills: 0 | Status: DISCONTINUED | OUTPATIENT
Start: 2022-01-01 | End: 2022-01-01

## 2022-01-01 RX ORDER — HYDROMORPHONE HYDROCHLORIDE 2 MG/ML
0.25 INJECTION INTRAMUSCULAR; INTRAVENOUS; SUBCUTANEOUS
Refills: 0 | Status: DISCONTINUED | OUTPATIENT
Start: 2022-01-01 | End: 2022-01-01

## 2022-01-01 RX ORDER — ONDANSETRON 8 MG/1
4 TABLET, FILM COATED ORAL ONCE
Refills: 0 | Status: COMPLETED | OUTPATIENT
Start: 2022-01-01 | End: 2022-01-01

## 2022-01-01 RX ORDER — SODIUM CHLORIDE 9 MG/ML
2000 INJECTION, SOLUTION INTRAVENOUS ONCE
Refills: 0 | Status: COMPLETED | OUTPATIENT
Start: 2022-01-01 | End: 2022-01-01

## 2022-01-01 RX ORDER — HEPARIN SODIUM 5000 [USP'U]/ML
1200 INJECTION INTRAVENOUS; SUBCUTANEOUS
Qty: 25000 | Refills: 0 | Status: DISCONTINUED | OUTPATIENT
Start: 2022-01-01 | End: 2022-01-01

## 2022-01-01 RX ORDER — POTASSIUM CHLORIDE 20 MEQ
40 PACKET (EA) ORAL ONCE
Refills: 0 | Status: COMPLETED | OUTPATIENT
Start: 2022-01-01 | End: 2022-01-01

## 2022-01-01 RX ORDER — POTASSIUM CHLORIDE 20 MEQ
10 PACKET (EA) ORAL
Refills: 0 | Status: DISCONTINUED | OUTPATIENT
Start: 2022-01-01 | End: 2022-01-01

## 2022-01-01 RX ORDER — IOHEXOL 300 MG/ML
30 INJECTION, SOLUTION INTRAVENOUS ONCE
Refills: 0 | Status: COMPLETED | OUTPATIENT
Start: 2022-01-01 | End: 2022-01-01

## 2022-01-01 RX ORDER — INSULIN GLARGINE 100 [IU]/ML
8 INJECTION, SOLUTION SUBCUTANEOUS AT BEDTIME
Refills: 0 | Status: DISCONTINUED | OUTPATIENT
Start: 2022-01-01 | End: 2022-01-01

## 2022-01-01 RX ORDER — INSULIN HUMAN 100 [IU]/ML
3 INJECTION, SOLUTION SUBCUTANEOUS
Qty: 100 | Refills: 0 | Status: DISCONTINUED | OUTPATIENT
Start: 2022-01-01 | End: 2022-01-01

## 2022-01-01 RX ORDER — ATENOLOL 25 MG/1
1 TABLET ORAL
Qty: 0 | Refills: 0 | DISCHARGE

## 2022-01-01 RX ORDER — POTASSIUM PHOSPHATE, MONOBASIC POTASSIUM PHOSPHATE, DIBASIC 236; 224 MG/ML; MG/ML
15 INJECTION, SOLUTION INTRAVENOUS ONCE
Refills: 0 | Status: COMPLETED | OUTPATIENT
Start: 2022-01-01 | End: 2022-01-01

## 2022-01-01 RX ORDER — LANOLIN ALCOHOL/MO/W.PET/CERES
5 CREAM (GRAM) TOPICAL AT BEDTIME
Refills: 0 | Status: DISCONTINUED | OUTPATIENT
Start: 2022-01-01 | End: 2022-01-01

## 2022-01-01 RX ORDER — IPRATROPIUM/ALBUTEROL SULFATE 18-103MCG
3 AEROSOL WITH ADAPTER (GRAM) INHALATION EVERY 6 HOURS
Refills: 0 | Status: DISCONTINUED | OUTPATIENT
Start: 2022-01-01 | End: 2022-01-01

## 2022-01-01 RX ORDER — SODIUM CHLORIDE 9 MG/ML
10 INJECTION INTRAMUSCULAR; INTRAVENOUS; SUBCUTANEOUS
Refills: 0 | Status: DISCONTINUED | OUTPATIENT
Start: 2022-01-01 | End: 2022-01-01

## 2022-01-01 RX ORDER — POTASSIUM CHLORIDE 20 MEQ
10 PACKET (EA) ORAL
Refills: 0 | Status: COMPLETED | OUTPATIENT
Start: 2022-01-01 | End: 2022-01-01

## 2022-01-01 RX ORDER — POLYETHYLENE GLYCOL 3350 17 G/17G
17 POWDER, FOR SOLUTION ORAL DAILY
Refills: 0 | Status: DISCONTINUED | OUTPATIENT
Start: 2022-01-01 | End: 2022-01-01

## 2022-01-01 RX ORDER — HUMAN INSULIN 100 [IU]/ML
10 INJECTION, SUSPENSION SUBCUTANEOUS ONCE
Refills: 0 | Status: COMPLETED | OUTPATIENT
Start: 2022-01-01 | End: 2022-01-01

## 2022-01-01 RX ORDER — POTASSIUM CHLORIDE 20 MEQ
20 PACKET (EA) ORAL
Refills: 0 | Status: COMPLETED | OUTPATIENT
Start: 2022-01-01 | End: 2022-01-01

## 2022-01-01 RX ORDER — FENTANYL CITRATE 50 UG/ML
50 INJECTION INTRAVENOUS
Refills: 0 | Status: DISCONTINUED | OUTPATIENT
Start: 2022-01-01 | End: 2022-01-01

## 2022-01-01 RX ORDER — OXYCODONE HYDROCHLORIDE 5 MG/1
5 TABLET ORAL EVERY 4 HOURS
Refills: 0 | Status: DISCONTINUED | OUTPATIENT
Start: 2022-01-01 | End: 2022-01-01

## 2022-01-01 RX ORDER — NOREPINEPHRINE BITARTRATE/D5W 8 MG/250ML
0.05 PLASTIC BAG, INJECTION (ML) INTRAVENOUS
Qty: 8 | Refills: 0 | Status: DISCONTINUED | OUTPATIENT
Start: 2022-01-01 | End: 2022-01-01

## 2022-01-01 RX ORDER — MIDAZOLAM HYDROCHLORIDE 1 MG/ML
2 INJECTION, SOLUTION INTRAMUSCULAR; INTRAVENOUS
Refills: 0 | Status: DISCONTINUED | OUTPATIENT
Start: 2022-01-01 | End: 2022-01-01

## 2022-01-01 RX ORDER — KETAMINE HYDROCHLORIDE 100 MG/ML
50 INJECTION INTRAMUSCULAR; INTRAVENOUS ONCE
Refills: 0 | Status: DISCONTINUED | OUTPATIENT
Start: 2022-01-01 | End: 2022-01-01

## 2022-01-01 RX ORDER — DEXTROSE 50 % IN WATER 50 %
15 SYRINGE (ML) INTRAVENOUS ONCE
Refills: 0 | Status: DISCONTINUED | OUTPATIENT
Start: 2022-01-01 | End: 2022-01-01

## 2022-01-01 RX ORDER — OXYCODONE HYDROCHLORIDE 5 MG/1
2.5 TABLET ORAL EVERY 4 HOURS
Refills: 0 | Status: DISCONTINUED | OUTPATIENT
Start: 2022-01-01 | End: 2022-01-01

## 2022-01-01 RX ORDER — ONDANSETRON 8 MG/1
4 TABLET, FILM COATED ORAL EVERY 4 HOURS
Refills: 0 | Status: DISCONTINUED | OUTPATIENT
Start: 2022-01-01 | End: 2022-01-01

## 2022-01-01 RX ORDER — PIPERACILLIN AND TAZOBACTAM 4; .5 G/20ML; G/20ML
3.38 INJECTION, POWDER, LYOPHILIZED, FOR SOLUTION INTRAVENOUS ONCE
Refills: 0 | Status: COMPLETED | OUTPATIENT
Start: 2022-01-01 | End: 2022-01-01

## 2022-01-01 RX ORDER — DEXTROSE 50 % IN WATER 50 %
50 SYRINGE (ML) INTRAVENOUS
Refills: 0 | Status: DISCONTINUED | OUTPATIENT
Start: 2022-01-01 | End: 2022-01-01

## 2022-01-01 RX ORDER — INSULIN HUMAN 100 [IU]/ML
5 INJECTION, SOLUTION SUBCUTANEOUS
Qty: 50 | Refills: 0 | Status: DISCONTINUED | OUTPATIENT
Start: 2022-01-01 | End: 2022-01-01

## 2022-01-01 RX ORDER — SODIUM CHLORIDE 9 MG/ML
500 INJECTION INTRAMUSCULAR; INTRAVENOUS; SUBCUTANEOUS ONCE
Refills: 0 | Status: DISCONTINUED | OUTPATIENT
Start: 2022-01-01 | End: 2022-01-01

## 2022-01-01 RX ORDER — VASOPRESSIN 20 [USP'U]/ML
0.04 INJECTION INTRAVENOUS
Qty: 50 | Refills: 0 | Status: DISCONTINUED | OUTPATIENT
Start: 2022-01-01 | End: 2022-01-01

## 2022-01-01 RX ORDER — HEPARIN SODIUM 5000 [USP'U]/ML
5000 INJECTION INTRAVENOUS; SUBCUTANEOUS EVERY 8 HOURS
Refills: 0 | Status: DISCONTINUED | OUTPATIENT
Start: 2022-01-01 | End: 2022-01-01

## 2022-01-01 RX ORDER — PHENYLEPHRINE HYDROCHLORIDE 10 MG/ML
0.1 INJECTION INTRAVENOUS
Qty: 40 | Refills: 0 | Status: DISCONTINUED | OUTPATIENT
Start: 2022-01-01 | End: 2022-01-01

## 2022-01-01 RX ORDER — CHLORHEXIDINE GLUCONATE 213 G/1000ML
15 SOLUTION TOPICAL
Refills: 0 | Status: DISCONTINUED | OUTPATIENT
Start: 2022-01-01 | End: 2022-01-01

## 2022-01-01 RX ORDER — ALBUTEROL 90 UG/1
2.5 AEROSOL, METERED ORAL ONCE
Refills: 0 | Status: COMPLETED | OUTPATIENT
Start: 2022-01-01 | End: 2022-01-01

## 2022-01-01 RX ORDER — CHLORHEXIDINE GLUCONATE 213 G/1000ML
15 SOLUTION TOPICAL EVERY 12 HOURS
Refills: 0 | Status: DISCONTINUED | OUTPATIENT
Start: 2022-01-01 | End: 2022-01-01

## 2022-01-01 RX ADMIN — FENTANYL CITRATE 50 MICROGRAM(S): 50 INJECTION INTRAVENOUS at 16:18

## 2022-01-01 RX ADMIN — HEPARIN SODIUM 5000 UNIT(S): 5000 INJECTION INTRAVENOUS; SUBCUTANEOUS at 22:37

## 2022-01-01 RX ADMIN — HEPARIN SODIUM 5000 UNIT(S): 5000 INJECTION INTRAVENOUS; SUBCUTANEOUS at 14:30

## 2022-01-01 RX ADMIN — Medication 200 GRAM(S): at 23:14

## 2022-01-01 RX ADMIN — MORPHINE SULFATE 2 MILLIGRAM(S): 50 CAPSULE, EXTENDED RELEASE ORAL at 18:24

## 2022-01-01 RX ADMIN — Medication 100 MILLIEQUIVALENT(S): at 09:42

## 2022-01-01 RX ADMIN — Medication 2: at 22:38

## 2022-01-01 RX ADMIN — SODIUM CHLORIDE 150 MILLILITER(S): 9 INJECTION, SOLUTION INTRAVENOUS at 12:35

## 2022-01-01 RX ADMIN — Medication 100 MILLIEQUIVALENT(S): at 19:55

## 2022-01-01 RX ADMIN — ATORVASTATIN CALCIUM 40 MILLIGRAM(S): 80 TABLET, FILM COATED ORAL at 21:45

## 2022-01-01 RX ADMIN — FENTANYL CITRATE 50 MICROGRAM(S): 50 INJECTION INTRAVENOUS at 16:13

## 2022-01-01 RX ADMIN — HEPARIN SODIUM 5000 UNIT(S): 5000 INJECTION INTRAVENOUS; SUBCUTANEOUS at 23:27

## 2022-01-01 RX ADMIN — SODIUM CHLORIDE 1000 MILLILITER(S): 9 INJECTION INTRAMUSCULAR; INTRAVENOUS; SUBCUTANEOUS at 18:43

## 2022-01-01 RX ADMIN — HEPARIN SODIUM 5000 UNIT(S): 5000 INJECTION INTRAVENOUS; SUBCUTANEOUS at 14:08

## 2022-01-01 RX ADMIN — INSULIN HUMAN 3 UNIT(S)/HR: 100 INJECTION, SOLUTION SUBCUTANEOUS at 02:00

## 2022-01-01 RX ADMIN — FENTANYL CITRATE 7.71 MICROGRAM(S)/KG/HR: 50 INJECTION INTRAVENOUS at 10:06

## 2022-01-01 RX ADMIN — FENTANYL CITRATE 50 MICROGRAM(S): 50 INJECTION INTRAVENOUS at 09:33

## 2022-01-01 RX ADMIN — Medication 40 MILLIEQUIVALENT(S): at 05:13

## 2022-01-01 RX ADMIN — Medication 2: at 02:09

## 2022-01-01 RX ADMIN — TAMSULOSIN HYDROCHLORIDE 0.4 MILLIGRAM(S): 0.4 CAPSULE ORAL at 23:29

## 2022-01-01 RX ADMIN — SODIUM CHLORIDE 125 MILLILITER(S): 9 INJECTION, SOLUTION INTRAVENOUS at 01:29

## 2022-01-01 RX ADMIN — SODIUM CHLORIDE 75 MILLILITER(S): 9 INJECTION, SOLUTION INTRAVENOUS at 06:17

## 2022-01-01 RX ADMIN — Medication 2: at 05:36

## 2022-01-01 RX ADMIN — PANTOPRAZOLE SODIUM 40 MILLIGRAM(S): 20 TABLET, DELAYED RELEASE ORAL at 15:34

## 2022-01-01 RX ADMIN — ATENOLOL 25 MILLIGRAM(S): 25 TABLET ORAL at 06:44

## 2022-01-01 RX ADMIN — Medication 650 MILLIGRAM(S): at 12:47

## 2022-01-01 RX ADMIN — Medication 2: at 22:25

## 2022-01-01 RX ADMIN — Medication 40 MILLIEQUIVALENT(S): at 17:16

## 2022-01-01 RX ADMIN — HUMAN INSULIN 10 UNIT(S): 100 INJECTION, SUSPENSION SUBCUTANEOUS at 17:36

## 2022-01-01 RX ADMIN — Medication 125 MILLILITER(S): at 18:47

## 2022-01-01 RX ADMIN — Medication 4: at 17:26

## 2022-01-01 RX ADMIN — HYDROMORPHONE HYDROCHLORIDE 0.25 MILLIGRAM(S): 2 INJECTION INTRAMUSCULAR; INTRAVENOUS; SUBCUTANEOUS at 01:00

## 2022-01-01 RX ADMIN — Medication 2 PACKET(S): at 09:07

## 2022-01-01 RX ADMIN — Medication 50 MILLIEQUIVALENT(S): at 08:04

## 2022-01-01 RX ADMIN — FENTANYL CITRATE 50 MICROGRAM(S): 50 INJECTION INTRAVENOUS at 10:59

## 2022-01-01 RX ADMIN — CHLORHEXIDINE GLUCONATE 15 MILLILITER(S): 213 SOLUTION TOPICAL at 05:16

## 2022-01-01 RX ADMIN — Medication 50 MILLIEQUIVALENT(S): at 06:18

## 2022-01-01 RX ADMIN — CHLORHEXIDINE GLUCONATE 1 APPLICATION(S): 213 SOLUTION TOPICAL at 11:38

## 2022-01-01 RX ADMIN — HEPARIN SODIUM 5000 UNIT(S): 5000 INJECTION INTRAVENOUS; SUBCUTANEOUS at 05:36

## 2022-01-01 RX ADMIN — FENTANYL CITRATE 7.71 MICROGRAM(S)/KG/HR: 50 INJECTION INTRAVENOUS at 19:50

## 2022-01-01 RX ADMIN — SODIUM CHLORIDE 100 MILLILITER(S): 9 INJECTION, SOLUTION INTRAVENOUS at 18:28

## 2022-01-01 RX ADMIN — ONDANSETRON 4 MILLIGRAM(S): 8 TABLET, FILM COATED ORAL at 17:30

## 2022-01-01 RX ADMIN — SODIUM CHLORIDE 1000 MILLILITER(S): 9 INJECTION INTRAMUSCULAR; INTRAVENOUS; SUBCUTANEOUS at 17:30

## 2022-01-01 RX ADMIN — HYDROMORPHONE HYDROCHLORIDE 0.25 MILLIGRAM(S): 2 INJECTION INTRAMUSCULAR; INTRAVENOUS; SUBCUTANEOUS at 21:59

## 2022-01-01 RX ADMIN — Medication 5 MILLIGRAM(S): at 22:34

## 2022-01-01 RX ADMIN — Medication 400 MILLIGRAM(S): at 09:44

## 2022-01-01 RX ADMIN — Medication 7.23 MICROGRAM(S)/KG/MIN: at 23:13

## 2022-01-01 RX ADMIN — Medication 25 GRAM(S): at 12:31

## 2022-01-01 RX ADMIN — Medication 100 MILLIEQUIVALENT(S): at 08:20

## 2022-01-01 RX ADMIN — PANTOPRAZOLE SODIUM 40 MILLIGRAM(S): 20 TABLET, DELAYED RELEASE ORAL at 13:14

## 2022-01-01 RX ADMIN — Medication 2: at 21:54

## 2022-01-01 RX ADMIN — Medication 2: at 11:01

## 2022-01-01 RX ADMIN — Medication 2: at 17:58

## 2022-01-01 RX ADMIN — CHLORHEXIDINE GLUCONATE 15 MILLILITER(S): 213 SOLUTION TOPICAL at 05:36

## 2022-01-01 RX ADMIN — Medication 7.23 MICROGRAM(S)/KG/MIN: at 02:33

## 2022-01-01 RX ADMIN — Medication 2 PACKET(S): at 13:14

## 2022-01-01 RX ADMIN — Medication 4: at 14:28

## 2022-01-01 RX ADMIN — HEPARIN SODIUM 5000 UNIT(S): 5000 INJECTION INTRAVENOUS; SUBCUTANEOUS at 06:43

## 2022-01-01 RX ADMIN — POTASSIUM PHOSPHATE, MONOBASIC POTASSIUM PHOSPHATE, DIBASIC 62.5 MILLIMOLE(S): 236; 224 INJECTION, SOLUTION INTRAVENOUS at 06:12

## 2022-01-01 RX ADMIN — SODIUM CHLORIDE 90 MILLILITER(S): 9 INJECTION, SOLUTION INTRAVENOUS at 12:16

## 2022-01-01 RX ADMIN — HYDROMORPHONE HYDROCHLORIDE 0.25 MILLIGRAM(S): 2 INJECTION INTRAMUSCULAR; INTRAVENOUS; SUBCUTANEOUS at 01:15

## 2022-01-01 RX ADMIN — CHLORHEXIDINE GLUCONATE 1 APPLICATION(S): 213 SOLUTION TOPICAL at 12:29

## 2022-01-01 RX ADMIN — OXYCODONE HYDROCHLORIDE 5 MILLIGRAM(S): 5 TABLET ORAL at 14:14

## 2022-01-01 RX ADMIN — PANTOPRAZOLE SODIUM 40 MILLIGRAM(S): 20 TABLET, DELAYED RELEASE ORAL at 11:39

## 2022-01-01 RX ADMIN — ONDANSETRON 4 MILLIGRAM(S): 8 TABLET, FILM COATED ORAL at 09:44

## 2022-01-01 RX ADMIN — ATORVASTATIN CALCIUM 40 MILLIGRAM(S): 80 TABLET, FILM COATED ORAL at 22:34

## 2022-01-01 RX ADMIN — IOHEXOL 30 MILLILITER(S): 300 INJECTION, SOLUTION INTRAVENOUS at 15:30

## 2022-01-01 RX ADMIN — Medication 5 MILLIGRAM(S): at 23:29

## 2022-01-01 RX ADMIN — PANTOPRAZOLE SODIUM 80 MILLIGRAM(S): 20 TABLET, DELAYED RELEASE ORAL at 15:26

## 2022-01-01 RX ADMIN — CHLORHEXIDINE GLUCONATE 15 MILLILITER(S): 213 SOLUTION TOPICAL at 18:43

## 2022-01-01 RX ADMIN — Medication 2: at 09:44

## 2022-01-01 RX ADMIN — Medication 2: at 11:38

## 2022-01-01 RX ADMIN — Medication 400 MILLIGRAM(S): at 14:03

## 2022-01-01 RX ADMIN — Medication 25 GRAM(S): at 17:14

## 2022-01-01 RX ADMIN — INSULIN GLARGINE 8 UNIT(S): 100 INJECTION, SOLUTION SUBCUTANEOUS at 22:37

## 2022-01-01 RX ADMIN — HEPARIN SODIUM 5000 UNIT(S): 5000 INJECTION INTRAVENOUS; SUBCUTANEOUS at 21:00

## 2022-01-01 RX ADMIN — Medication 100 MILLIEQUIVALENT(S): at 18:25

## 2022-01-01 RX ADMIN — Medication 200 GRAM(S): at 18:48

## 2022-01-01 RX ADMIN — HYDROMORPHONE HYDROCHLORIDE 0.5 MILLIGRAM(S): 2 INJECTION INTRAMUSCULAR; INTRAVENOUS; SUBCUTANEOUS at 14:03

## 2022-01-01 RX ADMIN — Medication 5 MILLIGRAM(S): at 15:03

## 2022-01-01 RX ADMIN — POTASSIUM PHOSPHATE, MONOBASIC POTASSIUM PHOSPHATE, DIBASIC 83.33 MILLIMOLE(S): 236; 224 INJECTION, SOLUTION INTRAVENOUS at 13:14

## 2022-01-01 RX ADMIN — PANTOPRAZOLE SODIUM 40 MILLIGRAM(S): 20 TABLET, DELAYED RELEASE ORAL at 12:28

## 2022-01-01 RX ADMIN — SODIUM CHLORIDE 100 MILLILITER(S): 9 INJECTION, SOLUTION INTRAVENOUS at 05:37

## 2022-01-01 RX ADMIN — FENTANYL CITRATE 25 MICROGRAM(S): 50 INJECTION INTRAVENOUS at 10:06

## 2022-01-01 RX ADMIN — DEXMEDETOMIDINE HYDROCHLORIDE IN 0.9% SODIUM CHLORIDE 3.86 MICROGRAM(S)/KG/HR: 4 INJECTION INTRAVENOUS at 18:27

## 2022-01-01 RX ADMIN — IOHEXOL 30 MILLILITER(S): 300 INJECTION, SOLUTION INTRAVENOUS at 13:38

## 2022-01-01 RX ADMIN — CHLORHEXIDINE GLUCONATE 15 MILLILITER(S): 213 SOLUTION TOPICAL at 05:19

## 2022-01-01 RX ADMIN — SODIUM CHLORIDE 1000 MILLILITER(S): 9 INJECTION INTRAMUSCULAR; INTRAVENOUS; SUBCUTANEOUS at 14:00

## 2022-01-01 RX ADMIN — DEXMEDETOMIDINE HYDROCHLORIDE IN 0.9% SODIUM CHLORIDE 3.86 MICROGRAM(S)/KG/HR: 4 INJECTION INTRAVENOUS at 02:06

## 2022-01-01 RX ADMIN — ONDANSETRON 4 MILLIGRAM(S): 8 TABLET, FILM COATED ORAL at 13:10

## 2022-01-01 RX ADMIN — HEPARIN SODIUM 5000 UNIT(S): 5000 INJECTION INTRAVENOUS; SUBCUTANEOUS at 13:14

## 2022-01-01 RX ADMIN — HEPARIN SODIUM 5000 UNIT(S): 5000 INJECTION INTRAVENOUS; SUBCUTANEOUS at 05:46

## 2022-01-01 RX ADMIN — Medication 650 MILLIGRAM(S): at 17:25

## 2022-01-01 RX ADMIN — PANTOPRAZOLE SODIUM 40 MILLIGRAM(S): 20 TABLET, DELAYED RELEASE ORAL at 12:11

## 2022-01-01 RX ADMIN — SODIUM CHLORIDE 500 MILLILITER(S): 9 INJECTION, SOLUTION INTRAVENOUS at 14:16

## 2022-01-01 RX ADMIN — PIPERACILLIN AND TAZOBACTAM 200 GRAM(S): 4; .5 INJECTION, POWDER, LYOPHILIZED, FOR SOLUTION INTRAVENOUS at 17:30

## 2022-01-01 RX ADMIN — Medication 10 MILLIEQUIVALENT(S): at 19:35

## 2022-01-01 RX ADMIN — HEPARIN SODIUM 1200 UNIT(S)/HR: 5000 INJECTION INTRAVENOUS; SUBCUTANEOUS at 23:14

## 2022-01-01 RX ADMIN — INSULIN GLARGINE 8 UNIT(S): 100 INJECTION, SOLUTION SUBCUTANEOUS at 21:59

## 2022-01-01 RX ADMIN — Medication 1000 MILLIGRAM(S): at 12:00

## 2022-01-01 RX ADMIN — ATENOLOL 25 MILLIGRAM(S): 25 TABLET ORAL at 05:13

## 2022-01-01 RX ADMIN — Medication 2: at 06:52

## 2022-01-01 RX ADMIN — Medication 2: at 13:46

## 2022-01-01 RX ADMIN — HEPARIN SODIUM 5000 UNIT(S): 5000 INJECTION INTRAVENOUS; SUBCUTANEOUS at 05:00

## 2022-01-01 RX ADMIN — HYDROMORPHONE HYDROCHLORIDE 0.5 MILLIGRAM(S): 2 INJECTION INTRAMUSCULAR; INTRAVENOUS; SUBCUTANEOUS at 14:18

## 2022-01-01 RX ADMIN — HEPARIN SODIUM 5000 UNIT(S): 5000 INJECTION INTRAVENOUS; SUBCUTANEOUS at 21:54

## 2022-01-01 RX ADMIN — FENTANYL CITRATE 25 MICROGRAM(S): 50 INJECTION INTRAVENOUS at 10:20

## 2022-01-01 RX ADMIN — SODIUM CHLORIDE 500 MILLILITER(S): 9 INJECTION, SOLUTION INTRAVENOUS at 17:15

## 2022-01-01 RX ADMIN — ATORVASTATIN CALCIUM 40 MILLIGRAM(S): 80 TABLET, FILM COATED ORAL at 23:29

## 2022-01-01 RX ADMIN — MIDAZOLAM HYDROCHLORIDE 1 MILLIGRAM(S): 1 INJECTION, SOLUTION INTRAMUSCULAR; INTRAVENOUS at 18:15

## 2022-01-01 RX ADMIN — FENTANYL CITRATE 50 MICROGRAM(S): 50 INJECTION INTRAVENOUS at 19:49

## 2022-01-01 RX ADMIN — Medication 1000 MILLIGRAM(S): at 10:14

## 2022-01-01 RX ADMIN — Medication 5 MILLIGRAM(S): at 21:44

## 2022-01-01 RX ADMIN — Medication 100 MILLIEQUIVALENT(S): at 10:19

## 2022-01-01 RX ADMIN — Medication 100 MILLIEQUIVALENT(S): at 17:35

## 2022-01-01 RX ADMIN — DEXMEDETOMIDINE HYDROCHLORIDE IN 0.9% SODIUM CHLORIDE 3.86 MICROGRAM(S)/KG/HR: 4 INJECTION INTRAVENOUS at 11:08

## 2022-01-01 RX ADMIN — SODIUM CHLORIDE 2000 MILLILITER(S): 9 INJECTION, SOLUTION INTRAVENOUS at 04:44

## 2022-01-01 RX ADMIN — MORPHINE SULFATE 2 MILLIGRAM(S): 50 CAPSULE, EXTENDED RELEASE ORAL at 19:35

## 2022-01-01 RX ADMIN — CHLORHEXIDINE GLUCONATE 1 APPLICATION(S): 213 SOLUTION TOPICAL at 12:09

## 2022-01-01 RX ADMIN — Medication 100 MILLIEQUIVALENT(S): at 09:30

## 2022-01-01 RX ADMIN — POTASSIUM PHOSPHATE, MONOBASIC POTASSIUM PHOSPHATE, DIBASIC 62.5 MILLIMOLE(S): 236; 224 INJECTION, SOLUTION INTRAVENOUS at 08:27

## 2022-01-01 RX ADMIN — Medication 3 MILLILITER(S): at 11:56

## 2022-01-01 RX ADMIN — HEPARIN SODIUM 1200 UNIT(S)/HR: 5000 INJECTION INTRAVENOUS; SUBCUTANEOUS at 02:33

## 2022-01-01 RX ADMIN — Medication 4: at 22:00

## 2022-01-01 RX ADMIN — Medication 2 UNIT(S): at 02:10

## 2022-01-01 RX ADMIN — Medication 62.5 MILLIMOLE(S): at 00:14

## 2022-01-01 RX ADMIN — HEPARIN SODIUM 1200 UNIT(S)/HR: 5000 INJECTION INTRAVENOUS; SUBCUTANEOUS at 16:25

## 2022-01-01 RX ADMIN — FENTANYL CITRATE 50 MICROGRAM(S): 50 INJECTION INTRAVENOUS at 04:00

## 2022-01-01 RX ADMIN — Medication 4: at 15:07

## 2022-01-01 RX ADMIN — Medication 250 MILLIGRAM(S): at 17:39

## 2022-01-01 RX ADMIN — PIPERACILLIN AND TAZOBACTAM 200 GRAM(S): 4; .5 INJECTION, POWDER, LYOPHILIZED, FOR SOLUTION INTRAVENOUS at 16:40

## 2022-01-01 RX ADMIN — HYDROMORPHONE HYDROCHLORIDE 0.25 MILLIGRAM(S): 2 INJECTION INTRAMUSCULAR; INTRAVENOUS; SUBCUTANEOUS at 12:18

## 2022-01-01 RX ADMIN — PANTOPRAZOLE SODIUM 40 MILLIGRAM(S): 20 TABLET, DELAYED RELEASE ORAL at 12:35

## 2022-01-01 RX ADMIN — Medication 40 MILLIEQUIVALENT(S): at 22:39

## 2022-01-01 RX ADMIN — CHLORHEXIDINE GLUCONATE 1 APPLICATION(S): 213 SOLUTION TOPICAL at 15:35

## 2022-01-01 RX ADMIN — SODIUM CHLORIDE 2000 MILLILITER(S): 9 INJECTION, SOLUTION INTRAVENOUS at 21:29

## 2022-01-01 RX ADMIN — SODIUM CHLORIDE 75 MILLILITER(S): 9 INJECTION, SOLUTION INTRAVENOUS at 06:47

## 2022-01-01 RX ADMIN — SODIUM CHLORIDE 1000 MILLILITER(S): 9 INJECTION, SOLUTION INTRAVENOUS at 09:28

## 2022-01-01 RX ADMIN — HEPARIN SODIUM 5000 UNIT(S): 5000 INJECTION INTRAVENOUS; SUBCUTANEOUS at 13:33

## 2022-01-01 RX ADMIN — PANTOPRAZOLE SODIUM 40 MILLIGRAM(S): 20 TABLET, DELAYED RELEASE ORAL at 11:11

## 2022-01-01 RX ADMIN — PANTOPRAZOLE SODIUM 40 MILLIGRAM(S): 20 TABLET, DELAYED RELEASE ORAL at 11:42

## 2022-01-01 RX ADMIN — PANTOPRAZOLE SODIUM 40 MILLIGRAM(S): 20 TABLET, DELAYED RELEASE ORAL at 12:30

## 2022-01-01 RX ADMIN — ALBUTEROL 2.5 MILLIGRAM(S): 90 AEROSOL, METERED ORAL at 12:21

## 2022-01-01 RX ADMIN — Medication 100 MILLIEQUIVALENT(S): at 08:27

## 2022-01-01 RX ADMIN — Medication 400 MILLIGRAM(S): at 20:55

## 2022-01-01 RX ADMIN — DEXMEDETOMIDINE HYDROCHLORIDE IN 0.9% SODIUM CHLORIDE 3.86 MICROGRAM(S)/KG/HR: 4 INJECTION INTRAVENOUS at 20:55

## 2022-01-01 RX ADMIN — Medication 650 MILLIGRAM(S): at 06:11

## 2022-01-01 RX ADMIN — POTASSIUM PHOSPHATE, MONOBASIC POTASSIUM PHOSPHATE, DIBASIC 83.33 MILLIMOLE(S): 236; 224 INJECTION, SOLUTION INTRAVENOUS at 17:13

## 2022-01-01 RX ADMIN — Medication 40 MILLIEQUIVALENT(S): at 03:05

## 2022-01-01 RX ADMIN — HYDROMORPHONE HYDROCHLORIDE 0.25 MILLIGRAM(S): 2 INJECTION INTRAMUSCULAR; INTRAVENOUS; SUBCUTANEOUS at 12:03

## 2022-01-01 RX ADMIN — FENTANYL CITRATE 7.71 MICROGRAM(S)/KG/HR: 50 INJECTION INTRAVENOUS at 01:49

## 2022-01-01 RX ADMIN — SODIUM CHLORIDE 2000 MILLILITER(S): 9 INJECTION, SOLUTION INTRAVENOUS at 01:49

## 2022-01-01 RX ADMIN — CHLORHEXIDINE GLUCONATE 1 APPLICATION(S): 213 SOLUTION TOPICAL at 11:11

## 2022-01-01 RX ADMIN — Medication 100 MILLIEQUIVALENT(S): at 18:42

## 2022-01-01 RX ADMIN — Medication 2: at 02:36

## 2022-01-01 RX ADMIN — DEXMEDETOMIDINE HYDROCHLORIDE IN 0.9% SODIUM CHLORIDE 3.86 MICROGRAM(S)/KG/HR: 4 INJECTION INTRAVENOUS at 05:20

## 2022-01-01 RX ADMIN — HEPARIN SODIUM 5000 UNIT(S): 5000 INJECTION INTRAVENOUS; SUBCUTANEOUS at 22:33

## 2022-01-01 RX ADMIN — Medication 2: at 05:27

## 2022-01-01 RX ADMIN — CHLORHEXIDINE GLUCONATE 15 MILLILITER(S): 213 SOLUTION TOPICAL at 18:50

## 2022-01-01 RX ADMIN — KETAMINE HYDROCHLORIDE 50 MILLIGRAM(S): 100 INJECTION INTRAMUSCULAR; INTRAVENOUS at 18:10

## 2022-01-01 RX ADMIN — SODIUM CHLORIDE 1000 MILLILITER(S): 9 INJECTION INTRAMUSCULAR; INTRAVENOUS; SUBCUTANEOUS at 15:00

## 2022-01-01 RX ADMIN — HEPARIN SODIUM 5000 UNIT(S): 5000 INJECTION INTRAVENOUS; SUBCUTANEOUS at 11:39

## 2022-01-01 RX ADMIN — Medication 650 MILLIGRAM(S): at 15:45

## 2022-01-01 RX ADMIN — Medication 200 GRAM(S): at 00:06

## 2022-01-01 RX ADMIN — Medication 6: at 06:17

## 2022-01-01 RX ADMIN — MIDAZOLAM HYDROCHLORIDE 2 MILLIGRAM(S): 1 INJECTION, SOLUTION INTRAMUSCULAR; INTRAVENOUS at 21:20

## 2022-01-01 RX ADMIN — INSULIN GLARGINE 8 UNIT(S): 100 INJECTION, SOLUTION SUBCUTANEOUS at 23:28

## 2022-01-01 RX ADMIN — Medication 4: at 22:37

## 2022-01-01 RX ADMIN — TAMSULOSIN HYDROCHLORIDE 0.4 MILLIGRAM(S): 0.4 CAPSULE ORAL at 21:45

## 2022-01-01 RX ADMIN — HEPARIN SODIUM 1200 UNIT(S)/HR: 5000 INJECTION INTRAVENOUS; SUBCUTANEOUS at 09:37

## 2022-01-01 RX ADMIN — DEXMEDETOMIDINE HYDROCHLORIDE IN 0.9% SODIUM CHLORIDE 3.86 MICROGRAM(S)/KG/HR: 4 INJECTION INTRAVENOUS at 10:09

## 2022-01-01 RX ADMIN — POTASSIUM PHOSPHATE, MONOBASIC POTASSIUM PHOSPHATE, DIBASIC 62.5 MILLIMOLE(S): 236; 224 INJECTION, SOLUTION INTRAVENOUS at 06:18

## 2022-01-01 RX ADMIN — Medication 4: at 02:10

## 2022-01-01 RX ADMIN — HYDROMORPHONE HYDROCHLORIDE 0.25 MILLIGRAM(S): 2 INJECTION INTRAMUSCULAR; INTRAVENOUS; SUBCUTANEOUS at 22:15

## 2022-01-01 RX ADMIN — Medication 10 MILLIEQUIVALENT(S): at 18:40

## 2022-01-01 RX ADMIN — HEPARIN SODIUM 5000 UNIT(S): 5000 INJECTION INTRAVENOUS; SUBCUTANEOUS at 05:28

## 2022-01-01 RX ADMIN — MIDAZOLAM HYDROCHLORIDE 1 MILLIGRAM(S): 1 INJECTION, SOLUTION INTRAMUSCULAR; INTRAVENOUS at 19:49

## 2022-01-01 RX ADMIN — Medication 2: at 18:38

## 2022-01-01 RX ADMIN — Medication 200 GRAM(S): at 14:14

## 2022-01-01 RX ADMIN — MIDAZOLAM HYDROCHLORIDE 2 MILLIGRAM(S): 1 INJECTION, SOLUTION INTRAMUSCULAR; INTRAVENOUS at 01:18

## 2022-01-01 RX ADMIN — SODIUM CHLORIDE 75 MILLILITER(S): 9 INJECTION, SOLUTION INTRAVENOUS at 12:09

## 2022-01-01 RX ADMIN — Medication 650 MILLIGRAM(S): at 07:11

## 2022-01-01 RX ADMIN — HEPARIN SODIUM 5000 UNIT(S): 5000 INJECTION INTRAVENOUS; SUBCUTANEOUS at 05:13

## 2022-01-01 RX ADMIN — Medication 2: at 18:50

## 2022-01-01 RX ADMIN — Medication 4: at 12:11

## 2022-01-01 RX ADMIN — ATORVASTATIN CALCIUM 40 MILLIGRAM(S): 80 TABLET, FILM COATED ORAL at 23:53

## 2022-01-01 RX ADMIN — Medication 2: at 17:38

## 2022-01-01 RX ADMIN — FENTANYL CITRATE 50 MICROGRAM(S): 50 INJECTION INTRAVENOUS at 04:15

## 2022-01-01 RX ADMIN — FENTANYL CITRATE 50 MICROGRAM(S): 50 INJECTION INTRAVENOUS at 09:18

## 2022-01-01 RX ADMIN — ATENOLOL 25 MILLIGRAM(S): 25 TABLET ORAL at 06:11

## 2022-01-01 RX ADMIN — FENTANYL CITRATE 50 MICROGRAM(S): 50 INJECTION INTRAVENOUS at 11:14

## 2022-01-01 RX ADMIN — Medication 125 MILLILITER(S): at 12:34

## 2022-01-01 RX ADMIN — CHLORHEXIDINE GLUCONATE 1 APPLICATION(S): 213 SOLUTION TOPICAL at 12:33

## 2022-01-01 RX ADMIN — POTASSIUM PHOSPHATE, MONOBASIC POTASSIUM PHOSPHATE, DIBASIC 83.33 MILLIMOLE(S): 236; 224 INJECTION, SOLUTION INTRAVENOUS at 13:13

## 2022-01-01 RX ADMIN — ONDANSETRON 4 MILLIGRAM(S): 8 TABLET, FILM COATED ORAL at 15:10

## 2022-01-01 RX ADMIN — Medication 100 MILLIEQUIVALENT(S): at 19:41

## 2022-01-01 RX ADMIN — SODIUM CHLORIDE 500 MILLILITER(S): 9 INJECTION, SOLUTION INTRAVENOUS at 15:06

## 2022-01-01 RX ADMIN — Medication 1000 MILLIGRAM(S): at 14:30

## 2022-01-01 RX ADMIN — Medication 5 MILLIGRAM(S): at 22:36

## 2022-01-01 RX ADMIN — FENTANYL CITRATE 50 MICROGRAM(S): 50 INJECTION INTRAVENOUS at 18:19

## 2022-01-01 RX ADMIN — Medication 50 MILLIEQUIVALENT(S): at 09:26

## 2022-01-01 RX ADMIN — FENTANYL CITRATE 7.71 MICROGRAM(S)/KG/HR: 50 INJECTION INTRAVENOUS at 10:08

## 2022-01-01 RX ADMIN — Medication 650 MILLIGRAM(S): at 18:21

## 2022-01-01 RX ADMIN — Medication 400 MILLIGRAM(S): at 11:00

## 2022-01-01 RX ADMIN — Medication 40 MILLIEQUIVALENT(S): at 06:11

## 2022-01-01 RX ADMIN — FENTANYL CITRATE 50 MICROGRAM(S): 50 INJECTION INTRAVENOUS at 18:04

## 2022-01-01 RX ADMIN — SODIUM CHLORIDE 150 MILLILITER(S): 9 INJECTION, SOLUTION INTRAVENOUS at 05:19

## 2022-01-01 RX ADMIN — ONDANSETRON 4 MILLIGRAM(S): 8 TABLET, FILM COATED ORAL at 16:40

## 2022-01-01 RX ADMIN — HEPARIN SODIUM 5000 UNIT(S): 5000 INJECTION INTRAVENOUS; SUBCUTANEOUS at 21:44

## 2022-01-01 RX ADMIN — Medication 200 GRAM(S): at 22:22

## 2022-01-01 RX ADMIN — Medication 200 GRAM(S): at 03:23

## 2022-01-01 RX ADMIN — HEPARIN SODIUM 1200 UNIT(S)/HR: 5000 INJECTION INTRAVENOUS; SUBCUTANEOUS at 05:46

## 2022-01-01 RX ADMIN — ATORVASTATIN CALCIUM 40 MILLIGRAM(S): 80 TABLET, FILM COATED ORAL at 22:36

## 2022-01-01 RX ADMIN — MIDAZOLAM HYDROCHLORIDE 2 MILLIGRAM(S): 1 INJECTION, SOLUTION INTRAMUSCULAR; INTRAVENOUS at 01:13

## 2022-01-01 RX ADMIN — HEPARIN SODIUM 5000 UNIT(S): 5000 INJECTION INTRAVENOUS; SUBCUTANEOUS at 06:11

## 2022-01-01 RX ADMIN — HEPARIN SODIUM 5000 UNIT(S): 5000 INJECTION INTRAVENOUS; SUBCUTANEOUS at 15:07

## 2022-01-01 RX ADMIN — HEPARIN SODIUM 5000 UNIT(S): 5000 INJECTION INTRAVENOUS; SUBCUTANEOUS at 15:37

## 2022-01-01 RX ADMIN — Medication 2: at 03:07

## 2022-01-01 RX ADMIN — HEPARIN SODIUM 5000 UNIT(S): 5000 INJECTION INTRAVENOUS; SUBCUTANEOUS at 13:13

## 2022-01-01 RX ADMIN — Medication 4: at 03:05

## 2022-01-01 RX ADMIN — OXYCODONE HYDROCHLORIDE 5 MILLIGRAM(S): 5 TABLET ORAL at 15:15

## 2022-01-09 NOTE — ED PROVIDER NOTE - CLINICAL SUMMARY MEDICAL DECISION MAKING FREE TEXT BOX
89yo male with pmh of leukemia presents with abd pain/n/v/d. With lower abd ttp, r/o intrabdominal path vs uti vs acs, labs ekg ,CT, ua

## 2022-01-09 NOTE — ED ADULT TRIAGE NOTE - DATE OF LAST VACCINATION
Maxwell Colon, it does not appear that they are requesting clearance just guidance on anticoagulation as I interpret the fax request   09-Oct-2021

## 2022-01-09 NOTE — ED PROVIDER NOTE - PATIENT PORTAL LINK FT
You can access the FollowMyHealth Patient Portal offered by [facility] by registering at the following website: [email or web address]. By joining [facility]’s FollowMyHealth portal, you will also be able to view your health information using other applications (apps) compatible with our system.

## 2022-01-09 NOTE — ED ADULT TRIAGE NOTE - CHIEF COMPLAINT QUOTE
Weakness, diarrhea and loss of appetite since Wednesday, C/O abdominal pain, no sick contacts, resp even/unlabored.

## 2022-01-09 NOTE — ED PROVIDER NOTE - PHYSICAL EXAMINATION
Const: Awake, alert and oriented. In no acute distress. Well appearing.  HEENT: NC/AT. Moist mucous membranes.  Eyes: No scleral icterus. EOMI.  Neck:. Soft and supple. Full ROM without pain.  Cardiac: Regular rate and regular rhythm. +S1/S2. Peripheral pulses 2+ and symmetric. No LE edema.  Resp: Speaking in full sentences. No evidence of respiratory distress. No wheezes, rales or rhonchi.  Abd: Soft, ttp at lower abd, non-distended. Normal bowel sounds in all 4 quadrants. No guarding or rebound.  Back: Spine midline and non-tender. No CVAT.  Skin: No rashes, abrasions or lacerations.  Lymph: No cervical lymphadenopathy.  Neuro: Awake, alert & oriented x 3. Moves all extremities symmetrically.

## 2022-01-09 NOTE — ED PROVIDER NOTE - OBJECTIVE STATEMENT
89yo male with pmh of leukemia presents with abd pain/n/v/d. Pt states 5 days ago had several hours of nbnb emesis then resolved and started up again the next day. PT today with non bloody diarrhea and lower abd pain. PT 4 days ago had an episode of hematuria which has now resolved. Pt denies fevers/chills, ha, loc, focal neuro deficits, cp/sob/palp, cough, testicular/scrotal pain/swelling, recent travel and sick contacts. PT vaccinated for COVID with booster. 89yo male with pmh of leukemia presents with abd pain/n/v/d. Pt states 5 days ago had several hours of nbnb emesis then resolved and started up again the next day. PT today with non bloody diarrhea and lower abd pain. PT 4 days ago had an episode of hematuria which has now resolved. Pt denies fevers/chills, ha, loc, focal neuro deficits, cp/sob/palp, cough, testicular/scrotal pain/swelling, recent travel and sick contacts. PT vaccinated for COVID with booster.    reports allergy to IV contrast not PO contrast

## 2022-01-13 PROBLEM — C95.90 LEUKEMIA, UNSPECIFIED NOT HAVING ACHIEVED REMISSION: Chronic | Status: ACTIVE | Noted: 2022-01-01

## 2022-01-13 NOTE — H&P ADULT - ASSESSMENT
89 yo male with a PMH of CAD, DM, HTN, leukemia, and a PSH of an open appendectomy and cholecystectomy who is presenting to the ED with a chief complaint of abdominal pain. In the ED,  vitals signs were notable for hypotension into the 80's/50's which increased to 105/60 after receiving 2 L NS boluses. Labs were notable for a WBC of 26, a lactate of 4.30, and a glucose of 662. CT scan demonstrated a small bowel obstruction with transition in distal ileum likely secondary to distal ileitis, multifocal pneumonia, and a bladder mass. General surgery was consulted for further intervention.           After discussing the risks and benefits of operative exploration with the patient's family, it was decided to take the patient to the OR for an exploratory laparotomy. He will be admitted to the SICU service.     - NPO/IVF  - Admit to SICU  - To OR for exploratory laparotomy

## 2022-01-13 NOTE — ED PROVIDER NOTE - ATTENDING CONTRIBUTION TO CARE
Derian: I performed a face to face evaluation of this patient and performed a full history and physical examination on the patient.  I agree with the resident's history, physical examination, and plan of the patient unless otherwise noted. My brief assessment is as follows: pmh as documented, on asa no a/c, here several days ago for vomiting, fobt positive, ct showing enteritis c/o hematemesis, some diarrhea with some ?blood occasionally, mild suprapubic discomfort, weakness with syncope. received fluids with ems, initial bp ~85/60, improved to ~115/60. denies cp, sob though satting ~89-91% on room air on arriavl. no fever, no other complaints. labs, protonix, possibel transfusion, ct, admission.

## 2022-01-13 NOTE — ED PROVIDER NOTE - PROGRESS NOTE DETAILS
Derian: pt with sbo, ngt placed, surg consulted, hyperglycemia treated, fluids given, with multifocal pna as well, given abx.  surg saw pt, for sicu admission.

## 2022-01-13 NOTE — ED ADULT NURSE NOTE - OBJECTIVE STATEMENT
Pt arrives with c/o nausea, vomiting blood, and generalized diffuse abd discomfort. Pt abdomen noted to be distended and tender diffusely. Pt reports he was here recently and told he had a GI bleed and to f/u with gastro but has been unable to do so at this time. Pt found to be hypotensive so moved into [facility] ER and placed on CM and .

## 2022-01-13 NOTE — ED PROVIDER NOTE - PHYSICAL EXAMINATION
Const: Awake, alert and oriented. In no acute distress. Well appearing.  HEENT: NC/AT. Moist mucous membranes.  Eyes: No scleral icterus. EOMI.  Neck:. Soft and supple. Full ROM without pain.  Cardiac: Regular rate and regular rhythm. +S1/S2. Peripheral pulses 2+ and symmetric. No LE edema.  Resp: Speaking in full sentences. No evidence of respiratory distress. No wheezes, rales or rhonchi.  Abd: Moderated distension near umbilicus. LOwer quadrant tenderness to palpation.   Back: Spine midline and non-tender. No CVAT.  Skin: No rashes, abrasions or lacerations.  Lymph: No cervical lymphadenopathy.  Neuro: Awake, alert & oriented x 3. Moves all extremities symmetrically.

## 2022-01-13 NOTE — ED PROVIDER NOTE - CLINICAL SUMMARY MEDICAL DECISION MAKING FREE TEXT BOX
89 yo male hx of Leukemia presents with continued abd pain, nausea, hematemesis and bloody stools. Will obtain basic labs, coags, type and screen, CT abd/pelvis w PO contrast. IVF. Monitor and reassess.

## 2022-01-13 NOTE — ED PROVIDER NOTE - TEMPLATE, MLM
[facility] Back Physical Therapy Treatment      Name: Jessica FLOWERS [facility]  Clinic Number: [id number]    Therapy Diagnosis:   Encounter Diagnosis   Name Primary?    Neck pain      Physician: Valeria Pride PA-C    Visit Date: 10/5/2020  Physician Orders: PT Eval and Treat   Medical Diagnosis from Referral: Neck pain  Evaluation Date: 2020  Authorization Period Expiration: 20  Plan of Care Expiration: 20  Reassessment Due: 10/28/20 or 10th visit   Visit # / Visits authorized: 3/ 20     Time In:8:30AM   Time Out9:15AM   Total Billable Time: 45 minutes     Precautions: Standard     Pattern of pain determined: REP        Subjective   Jessica FLOWERS reports no adverse effects from last tx. She is w/o c/o neck pn today    Patient reports tolerating previous visit no adverse effects  Patient reports their pain to be 0/10 on a 0-10 scale with 0 being no pain and 10 being the worst pain imaginable.  Pain Location: neck     Work and leisure: Occupation: Medical assistant in dermatology with [employer]  Leisure: Spending time with kids         Pt goals: To decrease her pain        Objective     Baseline IM Testing Results:   Date of testin20  ROM 36-93 deg   Max Peak Torque 122    Min Peak Torque 66    Flex/Ext Ratio 1.8:1   % below normative data 48           Outcomes:  Initial score:48%  Visit 5 score:  Goal:      Treatment    Pt was instructed in and performed the following:     Jessica FLOWERS received therapeutic exercises to develop/improved posture, cardiovascular endurance, muscular endurance, lumbar/cervical ROM, strength and muscular endurance for 45 minutes including the following exercises:     UBE 3/3  UT stretch 3 x 20 sec  Lev Scap stretch 3 x 20 sec  Scap retraction 20x  Seated cervical ext with towel 10x  supine iso retraction 10x  supine ext 10x        Peripheral muscle strengthening which included 1 set of 15-20 repetitions at a slow, controlled 10-13 second per rep pace focused on  strengthening supporting musculature for improved body mechanics and functional mobility.  Pt and therapist focused on proper form during treatment to ensure optimal strengthening of each targeted muscle group.  Machines were utilized including torso rotation, leg extension, leg curl, chest press, upright row. Tricep extension, bicep curl, leg press, and hip abduction added visit 3    [facility] 10/5/2020   Visit Number 3   VAS Pain Rating 0   Cervical Stretches - Retraction In Lying 10   Retraction in Sitting 10   Retraction with Extension 10   Scapular Retraction 20   Cervical Extension Seat Pad -   Seat Adjustment 480   Top Dead Center -   Counterweight -   Cervical Flexion -   Cervical Extension -   Cervical Peak Torque -   Cervical Weight 60   Repetitions 20   Rating of Perceived Exertion 4         Home Exercises Provided and Patient Education Provided       Education provided:   - effects of therapy  -posture in sitting    Written Home Exercises Provided: Patient instructed to cont prior HEP.  Exercises were reviewed and Jessica was able to demonstrate them prior to the end of the session.  Jessica demonstrated good  understanding of the education provided.     See EMR under Patient Instructions for exercises provided prior visit.          Assessment     Jessica timothy today's tx with progression of reps on MedX and with addition of UE peripheral machines well  today w/o c/o neck pn sx. She had increase in PE with increased reps on MedX.   Patient is making good progress towards established goals.  Pt will continue to benefit from skilled outpatient physical therapy to address the deficits stated in the impairment chart, provide pt/family education and to maximize pt's level of independence in the home and community environment.     Anticipated Barriers for therapy: Work schedule        Pt's spiritual, cultural and educational needs considered and pt agreeable to plan of care and goals as stated  below:         Goals:     Short term goals: 6 weeks or 10 visits   1.  Pt will demonstrate increased cervical ROM as measured by med ex by 3 degrees from initial test which results in improved  ROM of neck for ease with ADLs and driving  2. Pt will demonstrate independence with reducing or controlling symptoms with ther ex, movement, or position independently, able to reduce pain 1-2 points on pain scale using strategies taught in therapy  3. Pt will demonstrate increased MedX average isometric strength value by 25% with  when compared to the initial testing resulting in improved ability to perform bending, lifting, and carrying activities safely, confidently.        Long term goals: 10 weeks or 20 visits  1. Pt will demonstratte increased cervical ROM as measured by med ex by 6 degrees from initial test which results in functional ROM of neck for ease with ADLs and driving  2. Pt will demonstrate increased MedX average isometric strength value  by 50% from initial test to improve ability to lift and carry, and sustain good posture while performing ADL's  3.Pt will demonstrate reduced pain and improved functional outcomes as reported on the Neck Disability Index by reaching a score of 20 or less in order to demonstrate subjective improvement in pt's condition.    4. Pt will demonstrate independence with reducing or controlling symptoms with ther ex, movement, or position independently, able to reduce pain 2-4 points on pain scale using strategies taught in therapy  5. Pt will demonstrate independence with the HEP at discharge      Plan   Continue with established Plan of Care towards established PT goals.      General

## 2022-01-13 NOTE — ED ADULT NURSE REASSESSMENT NOTE - NS ED NURSE REASSESS COMMENT FT1
MD Honeycutt and MD castelan made aware of pt's vital signs and increasing SOB as well as abdominal distention. Awaiting CT results.

## 2022-01-13 NOTE — H&P ADULT - HISTORY OF PRESENT ILLNESS
ACUTE CARE SURGERY H&P    HPI:  89 yo male with a PMH of CAD, DM, HTN, leukemia, and a PSH of an open appendectomy and cholecystectomy who is presenting to the ED with a chief complaint of abdominal pain. He states that earlier today, he experienced nausea, had an episode of hematemesis, and had a bloody bowel movement. In addition, he had an episode of dizziness and passed out earlier in the day, he denies head strike or loss of consciousness. Of note, he was in the ED approximately 5 days ago for complaints of abdominal pain; a CT scan was done which demonstrated gastroenteritis, and he was discharged home at that time. He states he hasn't had a bowel movement since Sunday morning, approximately 4 days ago.      In the ED,  vitals signs were notable for hypotension into the 80's/50's which increased to 105/60 after receiving 2 L NS boluses. Labs were notable for a WBC of 26, a lactate of 4.30, and a glucose of 662. CT scan demonstrated a small bowel obstruction with transition in distal ileum likely secondary to distal ileitis, multifocal pneumonia, and a bladder mass. General surgery was consulted for further intervention.                                                                                                                                                                                                                                                                           PAST MEDICAL HISTORY:  Leukemia        PAST SURGICAL HISTORY:  History of cholecystectomy    History of appendectomy        ALLERGIES:  IV Contrast (Unknown)      FAMILY HISTORY: Noncontributory    SOCIAL HISTORY: Denies tobacco, EtOH, illicit substance use.     HOME MEDICATIONS:    MEDICATIONS  (STANDING):  potassium chloride  10 mEq/100 mL IVPB 10 milliEquivalent(s) IV Intermittent every 1 hour    MEDICATIONS  (PRN):      VITALS & I/Os:  Vital Signs Last 24 Hrs  T(C): 36.8 (13 Jan 2022 19:33), Max: 36.8 (13 Jan 2022 19:33)  T(F): 98.2 (13 Jan 2022 19:33), Max: 98.2 (13 Jan 2022 19:33)  HR: 96 (13 Jan 2022 19:33) (96 - 106)  BP: 105/56 (13 Jan 2022 19:33) (85/58 - 106/50)  BP(mean): 65 (13 Jan 2022 17:22) (65 - 65)  RR: 18 (13 Jan 2022 19:33) (16 - 20)  SpO2: 95% (13 Jan 2022 19:33) (92% - 97%)  CAPILLARY BLOOD GLUCOSE      POCT Blood Glucose.: >530 mg/dL (13 Jan 2022 19:23)      I&O's Summary    13 Jan 2022 07:01  -  13 Jan 2022 19:47  --------------------------------------------------------  IN: 0 mL / OUT: 600 mL / NET: -600 mL        GENERAL: Elderly appearing male, laying in bed.  MENTAL STATUS: AAOx3. Appropriate affect.  HEENT: PERRLA. EOMI. MMM.  Trachea midline. No lymph node swelling or tenderness.  RESPIRATORY: Nonlabored on 4L NC  CARDIOVASCULAR: RRR.   GASTROINTESTINAL: Abdomen severely distended, tender to palpation in epigastric region  NEUROLOGIC: Cranial nerves II-XII grossly intact. No focal neurological deficits. Moves all extremities spontaneously. Sensation intact bilaterally.  INTEGUMENTARY: No overt rashes or lesions, petechia or purpura. Good turgor. No edema.  MUSCULOSKELETAL: No cyanosis or clubbing. No gross deformities.   LYMPHATIC: Palpation of neck reveals no swelling or tenderness of neck nodes. Palpation of groin reveals no swelling or tenderness of groin nodes.    LABS:                        14.4   26.51 )-----------( 258      ( 13 Jan 2022 15:27 )             43.5     01-13    137  |  95<L>  |  93.6<H>  ----------------------------<  662<HH>  4.3   |  20.0<L>  |  1.96<H>    Ca    9.3      13 Jan 2022 15:27    TPro  5.9<L>  /  Alb  2.0<L>  /  TBili  0.5  /  DBili  x   /  AST  23  /  ALT  17  /  AlkPhos  125<H>  01-13    Lactate: potassium chloride  10 mEq/100 mL IVPB 10 milliEquivalent(s) IV Intermittent every 1 hour     PT/INR - ( 13 Jan 2022 15:27 )   PT: 14.4 sec;   INR: 1.25 ratio         PTT - ( 13 Jan 2022 15:27 )  PTT:23.9 sec    CARDIAC MARKERS ( 13 Jan 2022 16:47 )  x     / 0.03 ng/mL / x     / x     / x          01-13 @ 16:48  4.30        IMAGING:      ACC: [id number] EXAM:  CT ABDOMEN AND PELVIS OC                        ACC: [id number] EXAM:  CT CHEST                          PROCEDURE DATE:  01/13/2022          INTERPRETATION:  CLINICAL INFORMATION: 89-year-old man with rectal   bleeding and hematemesis. History of leukemia    COMPARISON: CT scan 01/09/2022    CONTRAST/COMPLICATIONS:  IV Contrast: NONE  Oral Contrast: NONE (accession [id number]), Omnipaque 300 + Fruit 2o   (accession [id number])  Complications: None reported at time of study completion    PROCEDURE:      FINDINGS:  CHEST:  LUNGS AND LARGE AIRWAYS: Patent central airways. Right upper and   bilateral lower lobe nodular groundglass opacities consistent with   multifocal pneumonia.  PLEURA: Trace right pleural effusion.  VESSELS: Within normal limits.  HEART: Heart size is normal. Small pericardial effusion. Aortic root and   coronary artery calcification  MEDIASTINUM AND BAUDILIO: No lymphadenopathy.  CHEST WALL AND LOWER NECK: Within normal limits.    ABDOMEN AND PELVIS:  LIVER: Within normal limits.  BILE DUCTS: Normal caliber.  GALLBLADDER: Cholecystectomy.  SPLEEN: Within normal limits.  PANCREAS: Within normal limits.  ADRENALS: Nodular adrenal glands.  KIDNEYS/URETERS: Left parapelvic cyst    BLADDER: Anterior left bladder wall mass as previously reported.  REPRODUCTIVE ORGANS: Enlarged prostate.    BOWEL: Small bowel obstruction apparently secondary to distal ileitis.   Moderately distended stomach and distended esophagus containing oral   contrast. Appendix not visualized. Contrast present within the colon   likely from prior study.  PERITONEUM: No ascites.  VESSELS: Within normal limits.  RETROPERITONEUM/LYMPH NODES: No lymphadenopathy.  ABDOMINAL WALL: Fat-containing right inguinal hernia.  BONES: Degenerative change.    IMPRESSION:  Small bowel obstruction with transition in distal ileum likely secondary   to distal ileitis.  Multifocal pneumonia.  Bladder mass as previously demonstrated.        --- End of Report ---            FELIX TOWNSEND MD; Attending Radiologist  This document has been electronically signed. Jan 13 2022  5:40PM

## 2022-01-13 NOTE — ED PROVIDER NOTE - OBJECTIVE STATEMENT
89 yo male history of leukemia presents with lower abdominal pain, nausea, rectal bleeding and hematemesis. Patient states that he has been experiencing these symptoms over the past several days. He was 89 yo male history of leukemia presents with lower abdominal pain, nausea, rectal bleeding and hematemesis. Patient states that he has been experiencing these symptoms over the past several days. He was seen in the ED five days ago, underwent CT scan at that time, diagnosed w enteritis at that time. The patient states that he has been experiencing continued lower abdominal pain. Today, he experienced nausea, hematemesis and bloody stool. He felt lightheaded and passed out for 20 seconds, did not hit his head. Denies fever/chills, cough, sore throat, sob, cp, urinary complaints, focal weakness/numbness/tingling in extremities.

## 2022-01-13 NOTE — ED ADULT TRIAGE NOTE - CHIEF COMPLAINT QUOTE
c/o feeling weak and vomiting pt says he was here the other day for rectal bleeding. pt has # 18 in RAC, was given fluids and Zofran 4 mg IV

## 2022-01-13 NOTE — H&P ADULT - ATTENDING COMMENTS
seen and examined 01-13-22 @ 1845      1/9/2022 - ER visit for abdominal pain, nausea, nonbloody vomiting and nonbloody diarrhea. CT scan done and he was discharged home with a diagnosis of enteritis    returns today with worsening abdominal pain, hematemesis and bloody diarrhea.  last BM was 1/9 (prior to ER visit).    PMH  HTN  HLD  type 2 DM (diet controlled)  CAD  hairy cell leukemia (untreated)    PSH  1950 - open appendectomy  2008 @ [facility] - laparoscopic -> open cholecystectomy  right BKA for soft tissue sarcoma    soft / moderate diffuse tenderness / moderately distended  absent bowel sounds  right subcostal scar (open cholecystectomy)  RLQ paramedian scar (open appendectomy)    WBC = 26  Cr = 1.9 (baseline = 0.9)  glucose = 662  lactate = 4.3    CT chest w/o contrast - RUL / RLL / LLL nodular ground glass opacities consistent with multifocal pneumonia    CT abd/pelvis w/o contrast - diffusely dilated small bowel with contrast in the distal esophagus, stomach and proximal small bowel. residual contrast throughout the colon from 4 days ago.      acute abdomen  despite CT scan reading, her lactic acidosis and persistent leukocytosis raises concern for acute mesenteric ischemia  -the patient has a documented IV contrast allergy and is in [facility], so the risk of CTA outweigh benefits    -The risks (bleeding, infection, bowel injury, death), benefits and alternatives of diagnostic laparoscopic / possible laparotomy / possible bowel resection / possible ostomy / possible temporary abdominal closure were discussed with the patient. Written informed consent was obtained for emergent surgery.  I also discussed my concern for acute mesenteric ischemia with his grandson Jeremiah Holden by phone and he agrees with the plan.

## 2022-01-13 NOTE — ED ADULT NURSE NOTE - SEPSIS REFERENCE DATA CRITERIA 2
[address], [place]  Authorization for Invasive Procedures  1.  I hereby authorize Dr. Janell Rojas , my physician and whomever may be designated as the doctor's assistant, to perform the following operation and/or procedure:  Gastric fe occur: fever and allergic reactions, hemolytic reactions, transmission of disease such as hepatitis, AIDS, cytomegalovirus (CMV), and flluid overload.  In the event that I wish to have autologous transfusions of my own blood, or a directed donor transfusion Signature of Patient:  ________________________________________________ Date: _________Time: _________    Responsible person in case of minor or unconscious: _____________________________Relationship: ____________     Witness Signature: _______________ Abnormal Lactate: > 2

## 2022-01-14 NOTE — PROGRESS NOTE ADULT - ASSESSMENT
89 year old male s/p ex lap, small bowel resection for ischemic bowel  SMA exposure with no thrombus evacuated with balloon roman    intubated, on levo in SICU with open abdomen  cause of ischemia likely due to low flow state, no vascular etiology  continue to follow peripherally  rest of care as per SICU team

## 2022-01-14 NOTE — PROGRESS NOTE ADULT - SUBJECTIVE AND OBJECTIVE BOX
patient intubated and sedated. still with open abdomen/abthera. on levo      MEDICATIONS  (STANDING):  chlorhexidine 0.12% Liquid 15 milliLiter(s) Oral Mucosa two times a day  chlorhexidine 2% Cloths 1 Application(s) Topical daily  dextrose 50% Injectable 50 milliLiter(s) IV Push every 15 minutes  fentaNYL   Infusion 1 MICROgram(s)/kG/Hr (7.71 mL/Hr) IV Continuous <Continuous>  heparin  Infusion. 1200 Unit(s)/Hr (12 mL/Hr) IV Continuous <Continuous>  insulin regular Infusion 3 Unit(s)/Hr (3 mL/Hr) IV Continuous <Continuous>  multiple electrolytes Injection Type 1 1000 milliLiter(s) (75 mL/Hr) IV Continuous <Continuous>  norepinephrine Infusion 0.05 MICROgram(s)/kG/Min (7.23 mL/Hr) IV Continuous <Continuous>  pantoprazole  Injectable 40 milliGRAM(s) IV Push daily    MEDICATIONS  (PRN):      Vital Signs Last 24 Hrs  T(C): 37.7 (2022 07:00), Max: 37.7 (2022 07:00)  T(F): 99.9 (2022 07:00), Max: 99.9 (2022 07:00)  HR: 127 (2022 07:00) (96 - 136)  BP: 88/58 (2022 04:30) (85/58 - 114/91)  BP(mean): 67 (2022 04:30) (65 - 100)  RR: 22 (2022 07:00) (16 - 22)  SpO2: 91% (2022 07:00) (89% - 97%)    Physical Exam:    general: intubated, sedated   Respiratory: Breath Sounds equal & clear to auscultation, no accessory muscle use    Cardiovascular: tachycardic,    Gastrointestinal: Soft, abthera vac in place, adequate seal. serosanguinous output. nondistended    Extremities: right BKA stump well healed. left lower extremity with cool to touch foot         I&O's Detail    2022 07:01  -  2022 07:00  --------------------------------------------------------  IN:    Heparin Infusion: 60 mL    Insulin: 12 mL    IV PiggyBack: 100 mL    multiple electrolytes Injection Type 1 Bolus: 1000 mL    multiple electrolytes Injection Type 1.: 1300 mL    Norepinephrine: 57.7 mL  Total IN: 2529.7 mL    OUT:    Indwelling Catheter - Urethral (mL): 110 mL    Phenylephrine: 0 mL    VAC (Vacuum Assisted Closure) System (mL): 375 mL    Voided (mL): 600 mL  Total OUT: 1085 mL    Total NET: 1444.7 mL          LABS:                        13.0   19.79 )-----------( 221      ( 2022 01:06 )             40.4     01-14    142  |  113<H>  |  79.1<H>  ----------------------------<  289<H>  4.2   |  14.0<L>  |  1.85<H>    Ca    7.7<L>      2022 01:06  Phos  3.6     -  Mg     2.0     -    TPro  4.2<L>  /  Alb  1.5<L>  /  TBili  0.3<L>  /  DBili  0.2  /  AST  35  /  ALT  20  /  AlkPhos  95  -14    PT/INR - ( 2022 01:06 )   PT: 15.9 sec;   INR: 1.39 ratio         PTT - ( 2022 01:06 )  PTT:29.2 sec  Urinalysis Basic - ( 2022 02:32 )    Color: Red / Appearance: very cloudy / S.010 / pH: x  Gluc: x / Ketone: Trace  / Bili: Negative / Urobili: Negative mg/dL   Blood: x / Protein: 100 / Nitrite: Negative   Leuk Esterase: Trace / RBC: >50 /HPF / WBC 0-2   Sq Epi: x / Non Sq Epi: Occasional / Bacteria: Moderate        RADIOLOGY & ADDITIONAL STUDIES:

## 2022-01-14 NOTE — PROCEDURE NOTE - NSICDXPROCEDURE_GEN_ALL_CORE_FT
PROCEDURES:  Insertion, catheter, non-tunneled, age 5 years or older 14-Jan-2022 11:03:55  Marshall Angel

## 2022-01-14 NOTE — PROGRESS NOTE ADULT - SUBJECTIVE AND OBJECTIVE BOX
HISTORY  89y Male hx of HTN, DM, presents to the hospital with poor po intake, nausea and vomiting on 1/9, found to have enteritis. Patient continues to have vomiting and poor po intake x 3days, presents today with severe abdominal pain, multiple syncopal episodes.  Patient in hypovolemia shock, lactic acidosis and DKA with exam and CT concerning for mesenteric ischemia.  Patient brought to the OR for exploratory laparotomy, where he was found with significant necrosis of the small intestine. SBR was performed with SMA exploration. No thrombosis identified and was consider patent. Patient brought to the SICU for resuscitation and close monitoring of shock.  Patient still sedated and not c/o of any pain. Patient on levophed, with sinus tachycardia. patient with lactate of 3.4 from 2.6. POC US with hyperdynamic LV, underfilled RV. IVC difficult to visualize. No b lines noted. SVV 30's, CO 2L. Unable to obtain CVP, attending does not want a central line placed at this time.  Patient is oliguric. Patient is volume responsive. Formal echo pending.  Repeat labs pending. Patient hypoxic immediately post operatively. CT scan consistent with possible aspiration pneumonitis vs pneumonia.  CXR with worsening infiltrates. NPO. Minimal out of NGT. Abthera with serosanginous output about 350cc. Patient is oliguric post op 50cc/ 15cc /15cc. Remains with gross hematuria, plan to send cytology. Irrigated to r/o obstruction. Remains on insulin gtt, improving hyperglycemia. Will continue gtt if gap remains open and lactate clears. Started on heparin gtt for mesenteric ischemia as per surgeon request. Team will monitor closely h/h and outputs.     SUBJECTIVE/ROS:  [ ] A ten-point review of systems was otherwise negative except as noted.  [x ] Due to altered mental status/intubation, subjective information were not able to be obtained from the patient. History was obtained, to the extent possible, from review of the chart and collateral sources of information.      NEURO  RASS: -4    GCS: 3     CAM ICU:  Exam: intubated and sedated, paralyzed   Meds: fentaNYL   Infusion 1 MICROgram(s)/kG/Hr IV Continuous <Continuous>    [x] Adequacy of sedation and pain control has been assessed and adjusted      RESPIRATORY  RR: 22 (01-14-22 @ 04:15) (16 - 22)  SpO2: 89% (01-14-22 @ 04:15) (89% - 97%)  Wt(kg): --  Exam: unlabored, diminished bs b/l   Mechanical Ventilation: Mode: AC/ CMV (Assist Control/ Continuous Mandatory Ventilation), RR (machine): 22, RR (patient): 22, TV (machine): 500, FiO2: 100, PEEP: 5, ITime: 1, MAP: 12, PIP: 21  ABG - ( 14 Jan 2022 01:04 )  pH: 7.160 /  pCO2: 43    /  pO2: 100   / HCO3: 15    / Base Excess: -13.4 /  SaO2: 98.4    Lactate: x                [ ] Extubation Readiness Assessed  Meds:       CARDIOVASCULAR  HR: 135 (01-14-22 @ 04:15) (96 - 136)  BP: 114/91 (01-14-22 @ 04:00) (85/58 - 114/91)  BP(mean): 100 (01-14-22 @ 04:00) (65 - 100)  ABP: 102/65 (01-14-22 @ 04:15) (83/44 - 149/67)  ABP(mean): 80 (01-14-22 @ 04:15) (56 - 92)  Wt(kg): --  CVP(cm H2O): --  VBG - ( 13 Jan 2022 16:48 )  pH: 7.410 /  pCO2: 40    /  pO2: 57    / HCO3: 25    / Base Excess: 0.8   /  SaO2: 87.2   Lactate: 4.30               Exam: sinus tachycardia  Cardiac Rhythm: sinus tachycardia   Perfusion     [x ]Adequate   [ ]Inadequate  Mentation   [x ]Normal       [ ]Reduced  Extremities  [x ]Warm         [ ]Cool  Volume Status [ ]Hypervolemic [x ]Euvolemic [ ]Hypovolemic  Meds: norepinephrine Infusion 0.05 MICROgram(s)/kG/Min IV Continuous <Continuous>        GI/NUTRITION  Exam: distended, tense, abthera in place draining serosanginous fluid  Diet: NPO   Meds: pantoprazole  Injectable 40 milliGRAM(s) IV Push daily      GENITOURINARY  I&O's Detail    01-13 @ 07:01  -  01-14 @ 04:33  --------------------------------------------------------  IN:    Heparin Infusion: 36 mL    Insulin: 9 mL    IV PiggyBack: 100 mL    multiple electrolytes Injection Type 1.: 1300 mL    Norepinephrine: 27.4 mL  Total IN: 1472.4 mL    OUT:    Indwelling Catheter - Urethral (mL): 60 mL    Phenylephrine: 0 mL    Voided (mL): 600 mL  Total OUT: 660 mL    Total NET: 812.4 mL        Weight (kg): 77.1 (01-13 @ 14:11)  01-14    142  |  113<H>  |  79.1<H>  ----------------------------<  289<H>  4.2   |  14.0<L>  |  1.85<H>    Ca    7.7<L>      14 Jan 2022 01:06  Phos  3.6     01-14  Mg     2.0     01-14    TPro  4.2<L>  /  Alb  1.5<L>  /  TBili  0.3<L>  /  DBili  0.2  /  AST  35  /  ALT  20  /  AlkPhos  95  01-14    [x ] Villegas catheter, indication: strict i/o's  Meds: multiple electrolytes Injection Type 1 1000 milliLiter(s) IV Continuous <Continuous>  multiple electrolytes Injection Type 1 Bolus 1000 milliLiter(s) IV Bolus once      Ext: R AKA, L cyanosis, increased cap refill    Skin: see above       HEMATOLOGIC  Meds: heparin  Infusion. 1200 Unit(s)/Hr IV Continuous <Continuous>    [x] VTE Prophylaxis                        13.0   19.79 )-----------( 221      ( 14 Jan 2022 01:06 )             40.4     PT/INR - ( 14 Jan 2022 01:06 )   PT: 15.9 sec;   INR: 1.39 ratio         PTT - ( 14 Jan 2022 01:06 )  PTT:29.2 sec  Transfusion     [ ] PRBC   [ ] Platelets   [ ] FFP   [ ] Cryoprecipitate      INFECTIOUS DISEASES  T(C): 37.2 (01-14-22 @ 04:15), Max: 37.2 (01-14-22 @ 04:15)  Wt(kg): --  WBC Count: 19.79 K/uL (01-14 @ 01:06)  WBC Count: 26.51 K/uL (01-13 @ 15:27)    Recent Cultures:    Meds:       ENDOCRINE  Capillary Blood Glucose    Meds: dextrose 50% Injectable 50 milliLiter(s) IV Push every 15 minutes  insulin regular Infusion 3 Unit(s)/Hr IV Continuous <Continuous>        ACCESS DEVICES:  [ ] Peripheral IV  [ ] Central Venous Line	[ ] R	[ ] L	[ ] IJ	[ ] Fem	[ ] SC	Placed:   [ ] Arterial Line		[ ] R	[ ] L	[ ] Fem	[ ] Rad	[ ] Ax	Placed:   [ ] PICC:					[ ] Mediport  [ ] Urinary Catheter, Date Placed:   [ ] Necessity of urinary, arterial, and venous catheters discussed    OTHER MEDICATIONS:  chlorhexidine 0.12% Liquid 15 milliLiter(s) Oral Mucosa two times a day  chlorhexidine 2% Cloths 1 Application(s) Topical daily      CODE STATUS:     IMAGING:

## 2022-01-14 NOTE — PATIENT PROFILE ADULT - FALL HARM RISK - HARM RISK INTERVENTIONS

## 2022-01-14 NOTE — PROGRESS NOTE ADULT - ASSESSMENT
A/p: 89y Male hx of HTN, DM, presents to the hospital with poor po intake, nausea and vomiting on 1/9, found to have enteritis. Patient continues to have vomiting and poor po intake x 3days, presents today with severe abdominal pain, multiple syncopal episodes.  Patient in hypovolemia shock, lactic acidosis and DKA with exam and CT concerning for mesenteric ischemia.  Patient brought to the OR for exploratory laparotomy, where he was found with significant necrosis of the small intestine. SBR was performed with SMA exploration. No thrombosis identified and was consider patent. Patient brought to the SICU for resuscitation and close monitoring of shock.       Neuro:  A/p: 89y Male hx of HTN, DM, presents to the hospital with poor po intake, nausea and vomiting on 1/9, found to have enteritis. Patient continues to have vomiting and poor po intake x 3days, presents today with severe abdominal pain, multiple syncopal episodes.  Patient in hypovolemia shock, lactic acidosis and DKA with exam and CT concerning for mesenteric ischemia.  Patient brought to the OR for exploratory laparotomy, where he was found with significant necrosis of the small intestine. SBR was performed with SMA exploration. No thrombosis identified and was consider patent. Patient brought to the SICU for resuscitation and close monitoring of shock.       Neuro: Hold all sedation and narcotics for now.     Card: Mesenteric ischemia likely secondary to low flow state following hypovolemia. Lactate is clearing. F/u formal echo, however, patient still appears hypovolemic given POC US and cardiac indices. Will continue resuscitation and wean levophed.     Pulm: Hypoxia and hypoxemia, p/f 150 likely secondary to aspiration pneumonitis vs pneumonia. Team should consider antibiotics for aspiration given hx of mesenteric ischemia    GI: Strict NPO. 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 Oliguria - requiring resuscitation.     Endo: Insulin gtt, DKA, wean insulin gtt as able after gap closes.  If does not close, start D5.     ID: None for right now however, team will consider abx     Hem: No evidence of SMA thrombosis, however, as per surgeon will continue heparin gtt. No bolus. Monitor closely abthera and UOP. Monitor h/h.     Skin: Open abdomen, RTOR today    Dispo: SICU, high risk for mortality and decline

## 2022-01-15 NOTE — DIETITIAN NUTRITION RISK NOTIFICATION - ADDITIONAL COMMENTS/DIETITIAN RECOMMENDATIONS
Provide adequate nutrition/hydration via tolerated route as feasible.  Obtain daily weights to monitor trends.

## 2022-01-15 NOTE — DIETITIAN INITIAL EVALUATION ADULT. - PERTINENT MEDS FT
MEDICATIONS  (STANDING):  chlorhexidine 0.12% Liquid 15 milliLiter(s) Oral Mucosa two times a day  chlorhexidine 2% Cloths 1 Application(s) Topical daily  dexMEDEtomidine Infusion 0.2 MICROgram(s)/kG/Hr (3.86 mL/Hr) IV Continuous <Continuous>  dextrose 40% Gel 15 Gram(s) Oral once  dextrose 50% Injectable 50 milliLiter(s) IV Push every 15 minutes  fentaNYL   Infusion 1 MICROgram(s)/kG/Hr (7.71 mL/Hr) IV Continuous <Continuous>  glucagon  Injectable 1 milliGRAM(s) IntraMuscular once  heparin  Infusion. 1200 Unit(s)/Hr (12 mL/Hr) IV Continuous <Continuous>  insulin lispro (ADMELOG) corrective regimen sliding scale   SubCutaneous every 4 hours  multiple electrolytes Injection Type 1 1000 milliLiter(s) (150 mL/Hr) IV Continuous <Continuous>  norepinephrine Infusion 0.05 MICROgram(s)/kG/Min (7.23 mL/Hr) IV Continuous <Continuous>  pantoprazole  Injectable 40 milliGRAM(s) IV Push daily  potassium chloride  20 mEq/100 mL IVPB 20 milliEquivalent(s) IV Intermittent every 4 hours  vasopressin Infusion 0.04 Unit(s)/Min (2.4 mL/Hr) IV Continuous <Continuous>    MEDICATIONS  (PRN):  fentaNYL    Injectable 50 MICROGram(s) IV Push every 3 hours PRN Breakthrough

## 2022-01-15 NOTE — BRIEF OPERATIVE NOTE - NSICDXBRIEFPROCEDURE_GEN_ALL_CORE_FT
PROCEDURES:  Small bowel resection 15-Adam-2022 09:18:58  Tod Lipscomb  
PROCEDURES:  Exploratory laparoscopy with conversion to laparotomy if indicated 15-Adam-2022 09:18:36  Tod Lipscomb  Small bowel resection 15-Adam-2022 09:18:58  Tod Lipscomb  Thrombectomy, artery, superior mesenteric 15-Adam-2022 09:21:32  Tod Lipscomb

## 2022-01-15 NOTE — DIETITIAN INITIAL EVALUATION ADULT. - ENTER TO (CAL/KG)
25 Mastoid Interpolation Flap Text: A decision was made to reconstruct the defect utilizing an interpolation axial flap and a staged reconstruction.  A telfa template was made of the defect.  This telfa template was then used to outline the mastoid interpolation flap.  The donor area for the pedicle flap was then injected with anesthesia.  The flap was excised through the skin and subcutaneous tissue down to the layer of the underlying musculature.  The pedicle flap was carefully excised within this deep plane to maintain its blood supply.  The edges of the donor site were undermined.   The donor site was closed in a primary fashion.  The pedicle was then rotated into position and sutured.  Once the tube was sutured into place, adequate blood supply was confirmed with blanching and refill.  The pedicle was then wrapped with xeroform gauze and dressed appropriately with a telfa and gauze bandage to ensure continued blood supply and protect the attached pedicle.

## 2022-01-15 NOTE — DIETITIAN INITIAL EVALUATION ADULT. - PERTINENT LABORATORY DATA
01-15 Na141 mmol/L Glu 167 mg/dL<H> K+ 3.4 mmol/L<L> Cr  1.90 mg/dL<H> BUN 69.3 mg/dL<H> Phos 3.3 mg/dL Alb n/a   PAB n/a

## 2022-01-15 NOTE — PROGRESS NOTE ADULT - ASSESSMENT
A/p: 89y Male hx of HTN, DM, presents to the hospital with poor po intake, nausea and vomiting on 1/9, found to have enteritis. Patient continues to have vomiting and poor po intake x 3days, presents today with severe abdominal pain, multiple syncopal episodes.  Patient in hypovolemia shock, lactic acidosis and DKA with exam and CT concerning for mesenteric ischemia.  Patient brought to the OR for exploratory laparotomy, where he was found with significant necrosis of the small intestine. SBR was performed with SMA exploration. No thrombosis identified and was consider patent. Patient brought to the SICU for resuscitation and close monitoring of shock.     Neuro: Continue precedex, likely lift post op     Card: Mesenteric ischemia likely secondary to low flow state following hypovolemia. Lactate is cleared. Patient appears to be well resuscitated now. Wean levophed as able.     Pulm: Aspiration pneumonitis - stable. Unchanged.     GI: Strict NPO. NGT to LCWS. RTOR today for second look?    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 Oliguria improved. ELIAN persistent. Continue to monitor and replete lytes prn     Endo: ISS, keep euglcyemic     ID: None    Hem: No evidence of SMA thrombosis, however, as per surgeon will continue heparin gtt. No bolus. Monitor closely abthera and UOP. Monitor h/h.     Skin: Open abdomen, RTOR today    Dispo: SICU, high risk for mortality and decline , grave prognosis

## 2022-01-15 NOTE — BRIEF OPERATIVE NOTE - NSICDXBRIEFPREOP_GEN_ALL_CORE_FT
PRE-OP DIAGNOSIS:  Acute mesenteric ischemia 15-Adam-2022 09:21:51  Tod Lipscomb  
PRE-OP DIAGNOSIS:  Ischemia, bowel 15-Adam-2022 14:56:44  Leslie Welch

## 2022-01-15 NOTE — PROGRESS NOTE ADULT - SUBJECTIVE AND OBJECTIVE BOX
24 HOUR EVENTS: Patient awake and alert, does not follow commands or c/o of pain. Started on precedex with improvement in restlessness.  Patient sating well, p/f ratio with significant improvement since prior.     SUBJECTIVE/ROS:  [ ] A ten-point review of systems was otherwise negative except as noted.  [ ] Due to altered mental status/intubation, subjective information were not able to be obtained from the patient. History was obtained, to the extent possible, from review of the chart and collateral sources of information.      NEURO  RASS:     GCS:     CAM ICU:  Exam:   Meds: dexMEDEtomidine Infusion 0.2 MICROgram(s)/kG/Hr IV Continuous <Continuous>  fentaNYL    Injectable 50 MICROGram(s) IV Push every 3 hours PRN Breakthrough  fentaNYL   Infusion 1 MICROgram(s)/kG/Hr IV Continuous <Continuous>    [x] Adequacy of sedation and pain control has been assessed and adjusted      RESPIRATORY  RR: 28 (01-15-22 @ 03:15) (22 - 29)  SpO2: 100% (01-15-22 @ 03:56) (89% - 100%)  Wt(kg): --  Exam: unlabored, clear to auscultation bilaterally  Mechanical Ventilation: Mode: AC/ CMV (Assist Control/ Continuous Mandatory Ventilation), RR (machine): 28, RR (patient): 28, TV (machine): 500, FiO2: 40, PEEP: 8, ITime: 1, MAP: 17, PIP: 30  ABG - ( 14 Jan 2022 22:24 )  pH: 7.450 /  pCO2: 24    /  pO2: 127   / HCO3: 17    / Base Excess: -7.3  /  SaO2: 99.5    Lactate: x                [ ] Extubation Readiness Assessed  Meds:       CARDIOVASCULAR  HR: 81 (01-15-22 @ 03:15) (78 - 166)  BP: 134/57 (01-15-22 @ 00:00) (83/69 - 134/57)  BP(mean): 79 (01-15-22 @ 00:00) (68 - 90)  ABP: 125/47 (01-15-22 @ 03:15) (75/57 - 167/90)  ABP(mean): 70 (01-15-22 @ 03:15) (62 - 119)  Wt(kg): --  CVP(cm H2O): --  VBG - ( 14 Jan 2022 12:43 )  pH: 7.270 /  pCO2: 39    /  pO2: <42   / HCO3: 18    / Base Excess: -9.0  /  SaO2: 68.5   Lactate: x                  Exam:  Cardiac Rhythm:  Perfusion     [ ]Adequate   [ ]Inadequate  Mentation   [ ]Normal       [ ]Reduced  Extremities  [ ]Warm         [ ]Cool  Volume Status [ ]Hypervolemic [ ]Euvolemic [ ]Hypovolemic  Meds: norepinephrine Infusion 0.05 MICROgram(s)/kG/Min IV Continuous <Continuous>        GI/NUTRITION  Exam:  Diet:  Meds: pantoprazole  Injectable 40 milliGRAM(s) IV Push daily      GENITOURINARY  I&O's Detail    01-13 @ 07:01 - 01-14 @ 07:00  --------------------------------------------------------  IN:    Heparin Infusion: 60 mL    Insulin: 12 mL    IV PiggyBack: 100 mL    multiple electrolytes Injection Type 1 Bolus: 1000 mL    multiple electrolytes Injection Type 1.: 1300 mL    Norepinephrine: 57.7 mL  Total IN: 2529.7 mL    OUT:    Indwelling Catheter - Urethral (mL): 110 mL    Phenylephrine: 0 mL    VAC (Vacuum Assisted Closure) System (mL): 375 mL    Voided (mL): 600 mL  Total OUT: 1085 mL    Total NET: 1444.7 mL      01-14 @ 07:01  -  01-15 @ 04:30  --------------------------------------------------------  IN:    Albumin 5%  - 250 mL: 500 mL    Dexmedetomidine: 29.2 mL    FentaNYL: 130.9 mL    Heparin Infusion: 240 mL    Insulin: 2 mL    IV PiggyBack: 200 mL    IV PiggyBack: 300 mL    IV PiggyBack: 187.5 mL    multiple electrolytes Injection Type 1 Bolus: 3000 mL    multiple electrolytes Injection Type 1.: 2850 mL    Norepinephrine: 339.8 mL  Total IN: 7779.4 mL    OUT:    Indwelling Catheter - Urethral (mL): 720 mL    Nasogastric/Oral tube (mL): 300 mL    VAC (Vacuum Assisted Closure) System (mL): 475 mL    Vasopressin: 0 mL  Total OUT: 1495 mL    Total NET: 6284.4 mL          01-14    140  |  108<H>  |  73.7<H>  ----------------------------<  180<H>  4.0   |  15.0<L>  |  2.03<H>    Ca    7.8<L>      14 Jan 2022 22:31  Phos  2.2     01-14  Mg     2.2     01-14    TPro  4.1<L>  /  Alb  1.5<L>  /  TBili  0.4  /  DBili  x   /  AST  79<H>  /  ALT  40  /  AlkPhos  73  01-14    [ ] Villegas catheter, indication: N/A  Meds: multiple electrolytes Injection Type 1 1000 milliLiter(s) IV Continuous <Continuous>        HEMATOLOGIC  Meds: heparin  Infusion. 1200 Unit(s)/Hr IV Continuous <Continuous>    [x] VTE Prophylaxis                        10.4   24.54 )-----------( 161      ( 14 Jan 2022 22:31 )             31.6     PT/INR - ( 14 Jan 2022 15:38 )   PT: 17.2 sec;   INR: 1.51 ratio         PTT - ( 14 Jan 2022 22:33 )  PTT:87.7 sec  Transfusion     [ ] PRBC   [ ] Platelets   [ ] FFP   [ ] Cryoprecipitate      INFECTIOUS DISEASES  T(C): 38.2 (01-15-22 @ 03:15), Max: 38.4 (01-14-22 @ 09:00)  Wt(kg): --  WBC Count: 24.54 K/uL (01-14 @ 22:31)  WBC Count: 35.86 K/uL (01-14 @ 15:38)  WBC Count: 36.72 K/uL (01-14 @ 08:32)    Recent Cultures:  Specimen Source: .Body Fluid Peritoneal Fluid, 01-14 @ 21:59; Results --; Gram Stain:   No polymorphonuclear leukocytes seen  No organisms seen  by cytocentrifuge; Organism: --    Meds:       ENDOCRINE  Capillary Blood Glucose    Meds: dextrose 40% Gel 15 Gram(s) Oral once  dextrose 50% Injectable 50 milliLiter(s) IV Push every 15 minutes  glucagon  Injectable 1 milliGRAM(s) IntraMuscular once  insulin lispro (ADMELOG) corrective regimen sliding scale   SubCutaneous every 4 hours  vasopressin Infusion 0.04 Unit(s)/Min IV Continuous <Continuous>        ACCESS DEVICES:  [ ] Peripheral IV  [ ] Central Venous Line	[ ] R	[ ] L	[ ] IJ	[ ] Fem	[ ] SC	Placed:   [ ] Arterial Line		[ ] R	[ ] L	[ ] Fem	[ ] Rad	[ ] Ax	Placed:   [ ] PICC:					[ ] Mediport  [ ] Urinary Catheter, Date Placed:   [ ] Necessity of urinary, arterial, and venous catheters discussed    OTHER MEDICATIONS:  chlorhexidine 0.12% Liquid 15 milliLiter(s) Oral Mucosa two times a day  chlorhexidine 2% Cloths 1 Application(s) Topical daily      CODE STATUS:     IMAGING:     24 HOUR EVENTS: Patient awake and alert, does not follow commands or c/o of pain. Started on precedex with improvement in restlessness.  Patient sating well, p/f ratio with significant improvement since prior. Alkalemic on AM abg, dropped RR. Decreasing doses of levophed requirements, lactate cleared. UOP improved significantly 50-75/hr after volume resuscitation. Cr pending. Plan for RTOR today?  Remains euglycemic off insulin gtt, and therapeutic on heparin gtt. Discussed with patient's family overall clinical course and gave update.  Explained grave prognosis.     SUBJECTIVE/ROS:  [ ] A ten-point review of systems was otherwise negative except as noted.  [x ] Due to altered mental status/intubation, subjective information were not able to be obtained from the patient. History was obtained, to the extent possible, from review of the chart and collateral sources of information.      NEURO  RASS:   +1- -1    GCS: 10T     CAM ICU: positive   Exam: intubated and sedated, does not follow commands.   Meds: dexMEDEtomidine Infusion 0.2 MICROgram(s)/kG/Hr IV Continuous <Continuous>  fentaNYL    Injectable 50 MICROGram(s) IV Push every 3 hours PRN Breakthrough  fentaNYL   Infusion 1 MICROgram(s)/kG/Hr IV Continuous <Continuous>    [x] Adequacy of sedation and pain control has been assessed and adjusted      RESPIRATORY  RR: 28 (01-15-22 @ 03:15) (22 - 29)  SpO2: 100% (01-15-22 @ 03:56) (89% - 100%)  Wt(kg): --  Exam: unlabored, clear to auscultation bilaterally  Mechanical Ventilation: Mode: AC/ CMV (Assist Control/ Continuous Mandatory Ventilation), RR (machine): 28, RR (patient): 28, TV (machine): 500, FiO2: 40, PEEP: 8, ITime: 1, MAP: 17, PIP: 30  ABG - ( 14 Jan 2022 22:24 )  pH: 7.450 /  pCO2: 24    /  pO2: 127   / HCO3: 17    / Base Excess: -7.3  /  SaO2: 99.5    Lactate: x                [ ] Extubation Readiness Assessed  Meds:       CARDIOVASCULAR  HR: 81 (01-15-22 @ 03:15) (78 - 166)  BP: 134/57 (01-15-22 @ 00:00) (83/69 - 134/57)  BP(mean): 79 (01-15-22 @ 00:00) (68 - 90)  ABP: 125/47 (01-15-22 @ 03:15) (75/57 - 167/90)  ABP(mean): 70 (01-15-22 @ 03:15) (62 - 119)  Wt(kg): --  CVP(cm H2O): --  VBG - ( 14 Jan 2022 12:43 )  pH: 7.270 /  pCO2: 39    /  pO2: <42   / HCO3: 18    / Base Excess: -9.0  /  SaO2: 68.5   Lactate: x                  Exam: nsr  Cardiac Rhythm: nsr  Perfusion     [x ]Adequate   [ ]Inadequate  Mentation   [x ]Normal       [ ]Reduced  Extremities  [x ]Warm         [ ]Cool  Volume Status [ ]Hypervolemic [x ]Euvolemic [ ]Hypovolemic  Meds: norepinephrine Infusion 0.05 MICROgram(s)/kG/Min IV Continuous <Continuous>        GI/NUTRITION  Exam: open abdomen, abthera wound vac in place   Diet: NPO/ NGT   Meds: pantoprazole  Injectable 40 milliGRAM(s) IV Push daily      GENITOURINARY  I&O's Detail    01-13 @ 07:01 - 01-14 @ 07:00  --------------------------------------------------------  IN:    Heparin Infusion: 60 mL    Insulin: 12 mL    IV PiggyBack: 100 mL    multiple electrolytes Injection Type 1 Bolus: 1000 mL    multiple electrolytes Injection Type 1.: 1300 mL    Norepinephrine: 57.7 mL  Total IN: 2529.7 mL    OUT:    Indwelling Catheter - Urethral (mL): 110 mL    Phenylephrine: 0 mL    VAC (Vacuum Assisted Closure) System (mL): 375 mL    Voided (mL): 600 mL  Total OUT: 1085 mL    Total NET: 1444.7 mL      01-14 @ 07:01  -  01-15 @ 04:30  --------------------------------------------------------  IN:    Albumin 5%  - 250 mL: 500 mL    Dexmedetomidine: 29.2 mL    FentaNYL: 130.9 mL    Heparin Infusion: 240 mL    Insulin: 2 mL    IV PiggyBack: 200 mL    IV PiggyBack: 300 mL    IV PiggyBack: 187.5 mL    multiple electrolytes Injection Type 1 Bolus: 3000 mL    multiple electrolytes Injection Type 1.: 2850 mL    Norepinephrine: 339.8 mL  Total IN: 7779.4 mL    OUT:    Indwelling Catheter - Urethral (mL): 720 mL    Nasogastric/Oral tube (mL): 300 mL    VAC (Vacuum Assisted Closure) System (mL): 475 mL    Vasopressin: 0 mL  Total OUT: 1495 mL    Total NET: 6284.4 mL          01-14    140  |  108<H>  |  73.7<H>  ----------------------------<  180<H>  4.0   |  15.0<L>  |  2.03<H>    Ca    7.8<L>      14 Jan 2022 22:31  Phos  2.2     01-14  Mg     2.2     01-14    TPro  4.1<L>  /  Alb  1.5<L>  /  TBili  0.4  /  DBili  x   /  AST  79<H>  /  ALT  40  /  AlkPhos  73  01-14    [x ] Villegas catheter, indication: strict i/o's   Meds: multiple electrolytes Injection Type 1 1000 milliLiter(s) IV Continuous <Continuous>    Ext: increased cap refill, cyanosis improved    Skin: See above     HEMATOLOGIC  Meds: heparin  Infusion. 1200 Unit(s)/Hr IV Continuous <Continuous>    [x] VTE Prophylaxis                        10.4   24.54 )-----------( 161      ( 14 Jan 2022 22:31 )             31.6     PT/INR - ( 14 Jan 2022 15:38 )   PT: 17.2 sec;   INR: 1.51 ratio         PTT - ( 14 Jan 2022 22:33 )  PTT:87.7 sec  Transfusion     [ ] PRBC   [ ] Platelets   [ ] FFP   [ ] Cryoprecipitate      INFECTIOUS DISEASES  T(C): 38.2 (01-15-22 @ 03:15), Max: 38.4 (01-14-22 @ 09:00)  Wt(kg): --  WBC Count: 24.54 K/uL (01-14 @ 22:31)  WBC Count: 35.86 K/uL (01-14 @ 15:38)  WBC Count: 36.72 K/uL (01-14 @ 08:32)    Recent Cultures:  Specimen Source: .Body Fluid Peritoneal Fluid, 01-14 @ 21:59; Results --; Gram Stain:   No polymorphonuclear leukocytes seen  No organisms seen  by cytocentrifuge; Organism: --    Meds:       ENDOCRINE  Capillary Blood Glucose    Meds: dextrose 40% Gel 15 Gram(s) Oral once  dextrose 50% Injectable 50 milliLiter(s) IV Push every 15 minutes  glucagon  Injectable 1 milliGRAM(s) IntraMuscular once  insulin lispro (ADMELOG) corrective regimen sliding scale   SubCutaneous every 4 hours  vasopressin Infusion 0.04 Unit(s)/Min IV Continuous <Continuous>        ACCESS DEVICES:  [ ] Peripheral IV  [ ] Central Venous Line	[ ] R	[ ] L	[ ] IJ	[ ] Fem	[ ] SC	Placed:   [ ] Arterial Line		[ ] R	[ ] L	[ ] Fem	[ ] Rad	[ ] Ax	Placed:   [ ] PICC:					[ ] Mediport  [ ] Urinary Catheter, Date Placed:   [ ] Necessity of urinary, arterial, and venous catheters discussed    OTHER MEDICATIONS:  chlorhexidine 0.12% Liquid 15 milliLiter(s) Oral Mucosa two times a day  chlorhexidine 2% Cloths 1 Application(s) Topical daily      CODE STATUS:     IMAGING:

## 2022-01-15 NOTE — DIETITIAN INITIAL EVALUATION ADULT. - OTHER INFO
89 year old male PMH of HTN, DM, presents to the hospital with poor po intake, nausea and vomiting on 1/9, found to have enteritis. Pt continues to have vomiting and poor po intake x 3days, now presents with severe abdominal pain, multiple syncopal episodes. Pt in hypovolemia shock, lactic acidosis and DKA with exam and CT concerning for mesenteric ischemia. Pt brought to the OR for exploratory laparotomy, where he was found with significant necrosis of the small intestine. SBR was performed with SMA exploration. Pt brought to the SICU for resuscitation and close monitoring of shock. Pt remains intubated. Strict NPO at this time, NGT to LCWS. Aware possibly to RTOR today for second look. Limited NFPE conducted. RD to follow up.

## 2022-01-15 NOTE — PROGRESS NOTE ADULT - SUBJECTIVE AND OBJECTIVE BOX
24 HOUR EVENTS:     SUBJECTIVE/ROS:  [ ] A ten-point review of systems was otherwise negative except as noted.  [ ] Due to altered mental status/intubation, subjective information were not able to be obtained from the patient. History was obtained, to the extent possible, from review of the chart and collateral sources of information.      NEURO  RASS:     GCS:     CAM ICU:  Exam:   Meds: dexMEDEtomidine Infusion 0.2 MICROgram(s)/kG/Hr IV Continuous <Continuous>  fentaNYL    Injectable 50 MICROGram(s) IV Push every 3 hours PRN Breakthrough  fentaNYL   Infusion 1 MICROgram(s)/kG/Hr IV Continuous <Continuous>    [x] Adequacy of sedation and pain control has been assessed and adjusted      RESPIRATORY  RR: 28 (01-15-22 @ 03:15) (22 - 29)  SpO2: 100% (01-15-22 @ 03:56) (89% - 100%)  Wt(kg): --  Exam: unlabored, clear to auscultation bilaterally  Mechanical Ventilation: Mode: AC/ CMV (Assist Control/ Continuous Mandatory Ventilation), RR (machine): 28, RR (patient): 28, TV (machine): 500, FiO2: 40, PEEP: 8, ITime: 1, MAP: 17, PIP: 30  ABG - ( 14 Jan 2022 22:24 )  pH: 7.450 /  pCO2: 24    /  pO2: 127   / HCO3: 17    / Base Excess: -7.3  /  SaO2: 99.5    Lactate: x                [ ] Extubation Readiness Assessed  Meds:       CARDIOVASCULAR  HR: 81 (01-15-22 @ 03:15) (78 - 166)  BP: 134/57 (01-15-22 @ 00:00) (83/69 - 134/57)  BP(mean): 79 (01-15-22 @ 00:00) (67 - 90)  ABP: 125/47 (01-15-22 @ 03:15) (75/57 - 167/90)  ABP(mean): 70 (01-15-22 @ 03:15) (62 - 119)  Wt(kg): --  CVP(cm H2O): --  VBG - ( 14 Jan 2022 12:43 )  pH: 7.270 /  pCO2: 39    /  pO2: <42   / HCO3: 18    / Base Excess: -9.0  /  SaO2: 68.5   Lactate: x                  Exam:  Cardiac Rhythm:  Perfusion     [ ]Adequate   [ ]Inadequate  Mentation   [ ]Normal       [ ]Reduced  Extremities  [ ]Warm         [ ]Cool  Volume Status [ ]Hypervolemic [ ]Euvolemic [ ]Hypovolemic  Meds: norepinephrine Infusion 0.05 MICROgram(s)/kG/Min IV Continuous <Continuous>        GI/NUTRITION  Exam:  Diet:  Meds: pantoprazole  Injectable 40 milliGRAM(s) IV Push daily      GENITOURINARY  I&O's Detail    01-13 @ 07:01 - 01-14 @ 07:00  --------------------------------------------------------  IN:    Heparin Infusion: 60 mL    Insulin: 12 mL    IV PiggyBack: 100 mL    multiple electrolytes Injection Type 1 Bolus: 1000 mL    multiple electrolytes Injection Type 1.: 1300 mL    Norepinephrine: 57.7 mL  Total IN: 2529.7 mL    OUT:    Indwelling Catheter - Urethral (mL): 110 mL    Phenylephrine: 0 mL    VAC (Vacuum Assisted Closure) System (mL): 375 mL    Voided (mL): 600 mL  Total OUT: 1085 mL    Total NET: 1444.7 mL      01-14 @ 07:01 - 01-15 @ 04:25  --------------------------------------------------------  IN:    Albumin 5%  - 250 mL: 500 mL    Dexmedetomidine: 29.2 mL    FentaNYL: 130.9 mL    Heparin Infusion: 240 mL    Insulin: 2 mL    IV PiggyBack: 200 mL    IV PiggyBack: 300 mL    IV PiggyBack: 187.5 mL    multiple electrolytes Injection Type 1 Bolus: 3000 mL    multiple electrolytes Injection Type 1.: 2850 mL    Norepinephrine: 339.8 mL  Total IN: 7779.4 mL    OUT:    Indwelling Catheter - Urethral (mL): 720 mL    Nasogastric/Oral tube (mL): 300 mL    VAC (Vacuum Assisted Closure) System (mL): 475 mL    Vasopressin: 0 mL  Total OUT: 1495 mL    Total NET: 6284.4 mL          01-14    140  |  108<H>  |  73.7<H>  ----------------------------<  180<H>  4.0   |  15.0<L>  |  2.03<H>    Ca    7.8<L>      14 Jan 2022 22:31  Phos  2.2     01-14  Mg     2.2     01-14    TPro  4.1<L>  /  Alb  1.5<L>  /  TBili  0.4  /  DBili  x   /  AST  79<H>  /  ALT  40  /  AlkPhos  73  01-14    [ ] Villegas catheter, indication: N/A  Meds: multiple electrolytes Injection Type 1 1000 milliLiter(s) IV Continuous <Continuous>        HEMATOLOGIC  Meds: heparin  Infusion. 1200 Unit(s)/Hr IV Continuous <Continuous>    [x] VTE Prophylaxis                        10.4   24.54 )-----------( 161      ( 14 Jan 2022 22:31 )             31.6     PT/INR - ( 14 Jan 2022 15:38 )   PT: 17.2 sec;   INR: 1.51 ratio         PTT - ( 14 Jan 2022 22:33 )  PTT:87.7 sec  Transfusion     [ ] PRBC   [ ] Platelets   [ ] FFP   [ ] Cryoprecipitate      INFECTIOUS DISEASES  T(C): 38.2 (01-15-22 @ 03:15), Max: 38.4 (01-14-22 @ 09:00)  Wt(kg): --  WBC Count: 24.54 K/uL (01-14 @ 22:31)  WBC Count: 35.86 K/uL (01-14 @ 15:38)  WBC Count: 36.72 K/uL (01-14 @ 08:32)    Recent Cultures:  Specimen Source: .Body Fluid Peritoneal Fluid, 01-14 @ 21:59; Results --; Gram Stain:   No polymorphonuclear leukocytes seen  No organisms seen  by cytocentrifuge; Organism: --    Meds:       ENDOCRINE  Capillary Blood Glucose    Meds: dextrose 40% Gel 15 Gram(s) Oral once  dextrose 50% Injectable 50 milliLiter(s) IV Push every 15 minutes  glucagon  Injectable 1 milliGRAM(s) IntraMuscular once  insulin lispro (ADMELOG) corrective regimen sliding scale   SubCutaneous every 4 hours  vasopressin Infusion 0.04 Unit(s)/Min IV Continuous <Continuous>        ACCESS DEVICES:  [ ] Peripheral IV  [ ] Central Venous Line	[ ] R	[ ] L	[ ] IJ	[ ] Fem	[ ] SC	Placed:   [ ] Arterial Line		[ ] R	[ ] L	[ ] Fem	[ ] Rad	[ ] Ax	Placed:   [ ] PICC:					[ ] Mediport  [ ] Urinary Catheter, Date Placed:   [ ] Necessity of urinary, arterial, and venous catheters discussed    OTHER MEDICATIONS:  chlorhexidine 0.12% Liquid 15 milliLiter(s) Oral Mucosa two times a day  chlorhexidine 2% Cloths 1 Application(s) Topical daily      CODE STATUS:     IMAGING:

## 2022-01-15 NOTE — BRIEF OPERATIVE NOTE - NSICDXBRIEFPOSTOP_GEN_ALL_CORE_FT
POST-OP DIAGNOSIS:  Open wound of abdomen, initial encounter 15-Adam-2022 09:22:42  Tod Lipscomb  Ischemia, bowel 15-Adam-2022 14:59:15  Leslie Welch  
POST-OP DIAGNOSIS:  Acute mesenteric ischemia 15-Adam-2022 09:22:03  Tod Lipscomb  Open wound of abdomen, initial encounter 15-Adam-2022 09:22:42  Tod Lipscomb

## 2022-01-15 NOTE — BRIEF OPERATIVE NOTE - OPERATION/FINDINGS
Patient was brought from SICU, his abdomen had an  Abthera dressing in place, that was removed leaving an open abdomen ( from yesterday's surgery ) with a medial supra and infraumbilical incision.  previous  resection sites where visualized and also partially  necrotic bowel visualized in both limbs,  segment of ischemic small bowel resected, 30cm and side to side anastomosis (TRACEY 80 #4) (TIA 80 # 1), Seromuscular Lambert stitching throughout to anastomosis. There was not Abthera dressing available, so  as a contingency measure the fascia was closed with  1 loop PDS and then a Negative pressure Wound vac  grey foam on top
Abdomen entered supraumbilical using Zaid technique  Laparoscope inserted, necrotic bowel visualized prompting conversion to open  Long segment of transmural ischemic small bowel resected, 180cm  65cm of viable proximal small bowel, and 90cm of viable distal small bowel left in discontinuity  Proximal SMA pulse palpated, distal pulse questionable  Exploration of SMA performed, Reza balloon passed distally, no embolus/thrombus   Transverse repair of SMA arteriotomy, with bounding SMA pulse distal and proximal to our repair  Abdomen left open and abthera vac placed

## 2022-01-15 NOTE — BRIEF OPERATIVE NOTE - TYPE OF ANESTHESIA
----- Message from Rahul Del Castillo sent at 7/2/2019 12:55 PM CDT -----  Contact: Self   Pt would like to reschedule injection appt for July 9. She needs an evening time if available.     [phone]    
Appt rescheduled.    Pamela Pennington  Clinical Assistant to Dr. Mahogany Katz     
General
General

## 2022-01-15 NOTE — DIETITIAN INITIAL EVALUATION ADULT. - ETIOLOGY
related to inability to meet sufficient protein-energy in setting of N/V, poor po intake, now s/p ex lap, found with significant necrosis of the small intestine s/p SBR

## 2022-01-15 NOTE — PROGRESS NOTE ADULT - SUBJECTIVE AND OBJECTIVE BOX
24 HOUR EVENTS:     SUBJECTIVE/ROS:  [ ] A ten-point review of systems was otherwise negative except as noted.  [ ] Due to altered mental status/intubation, subjective information were not able to be obtained from the patient. History was obtained, to the extent possible, from review of the chart and collateral sources of information.      NEURO  RASS:     GCS:     CAM ICU:  Exam:   Meds: dexMEDEtomidine Infusion 0.2 MICROgram(s)/kG/Hr IV Continuous <Continuous>  fentaNYL    Injectable 50 MICROGram(s) IV Push every 3 hours PRN Breakthrough  fentaNYL   Infusion 1 MICROgram(s)/kG/Hr IV Continuous <Continuous>    [x] Adequacy of sedation and pain control has been assessed and adjusted      RESPIRATORY  RR: 28 (01-15-22 @ 03:15) (22 - 29)  SpO2: 98% (01-15-22 @ 04:49) (90% - 100%)  Wt(kg): --  Exam: unlabored, clear to auscultation bilaterally  Mechanical Ventilation: Mode: AC/ CMV (Assist Control/ Continuous Mandatory Ventilation), RR (machine): 20, RR (patient): 20, TV (machine): 500, FiO2: 40, PEEP: 8, ITime: 1, MAP: 16, PIP: 30  ABG - ( 15 Adam 2022 04:34 )  pH: 7.420 /  pCO2: 25    /  pO2: 136   / HCO3: 16    / Base Excess: -8.3  /  SaO2: 99.4    Lactate: x                [ ] Extubation Readiness Assessed  Meds:       CARDIOVASCULAR  HR: 82 (01-15-22 @ 04:49) (78 - 166)  BP: 134/57 (01-15-22 @ 00:00) (83/69 - 134/57)  BP(mean): 79 (01-15-22 @ 00:00) (68 - 90)  ABP: 125/47 (01-15-22 @ 03:15) (75/57 - 167/90)  ABP(mean): 70 (01-15-22 @ 03:15) (62 - 119)  Wt(kg): --  CVP(cm H2O): --  VBG - ( 14 Jan 2022 12:43 )  pH: 7.270 /  pCO2: 39    /  pO2: <42   / HCO3: 18    / Base Excess: -9.0  /  SaO2: 68.5   Lactate: x                  Exam:  Cardiac Rhythm:  Perfusion     [ ]Adequate   [ ]Inadequate  Mentation   [ ]Normal       [ ]Reduced  Extremities  [ ]Warm         [ ]Cool  Volume Status [ ]Hypervolemic [ ]Euvolemic [ ]Hypovolemic  Meds: norepinephrine Infusion 0.05 MICROgram(s)/kG/Min IV Continuous <Continuous>        GI/NUTRITION  Exam:  Diet:  Meds: pantoprazole  Injectable 40 milliGRAM(s) IV Push daily      GENITOURINARY  I&O's Detail    01-13 @ 07:01 - 01-14 @ 07:00  --------------------------------------------------------  IN:    Heparin Infusion: 60 mL    Insulin: 12 mL    IV PiggyBack: 100 mL    multiple electrolytes Injection Type 1 Bolus: 1000 mL    multiple electrolytes Injection Type 1.: 1300 mL    Norepinephrine: 57.7 mL  Total IN: 2529.7 mL    OUT:    Indwelling Catheter - Urethral (mL): 110 mL    Phenylephrine: 0 mL    VAC (Vacuum Assisted Closure) System (mL): 375 mL    Voided (mL): 600 mL  Total OUT: 1085 mL    Total NET: 1444.7 mL      01-14 @ 07:01 - 01-15 @ 04:56  --------------------------------------------------------  IN:    Albumin 5%  - 250 mL: 500 mL    Dexmedetomidine: 29.2 mL    FentaNYL: 130.9 mL    Heparin Infusion: 240 mL    Insulin: 2 mL    IV PiggyBack: 200 mL    IV PiggyBack: 300 mL    IV PiggyBack: 187.5 mL    multiple electrolytes Injection Type 1 Bolus: 3000 mL    multiple electrolytes Injection Type 1.: 2850 mL    Norepinephrine: 339.8 mL  Total IN: 7779.4 mL    OUT:    Indwelling Catheter - Urethral (mL): 720 mL    Nasogastric/Oral tube (mL): 300 mL    VAC (Vacuum Assisted Closure) System (mL): 475 mL    Vasopressin: 0 mL  Total OUT: 1495 mL    Total NET: 6284.4 mL          01-14    140  |  108<H>  |  73.7<H>  ----------------------------<  180<H>  4.0   |  15.0<L>  |  2.03<H>    Ca    7.8<L>      14 Jan 2022 22:31  Phos  2.2     01-14  Mg     2.2     01-14    TPro  4.1<L>  /  Alb  1.5<L>  /  TBili  0.4  /  DBili  x   /  AST  79<H>  /  ALT  40  /  AlkPhos  73  01-14    [ ] Villegas catheter, indication: N/A  Meds: multiple electrolytes Injection Type 1 1000 milliLiter(s) IV Continuous <Continuous>        HEMATOLOGIC  Meds: heparin  Infusion. 1200 Unit(s)/Hr IV Continuous <Continuous>    [x] VTE Prophylaxis                        10.6   26.01 )-----------( 177      ( 15 Adam 2022 04:41 )             31.5     PT/INR - ( 14 Jan 2022 15:38 )   PT: 17.2 sec;   INR: 1.51 ratio         PTT - ( 14 Jan 2022 22:33 )  PTT:87.7 sec  Transfusion     [ ] PRBC   [ ] Platelets   [ ] FFP   [ ] Cryoprecipitate      INFECTIOUS DISEASES  T(C): 38.2 (01-15-22 @ 03:15), Max: 38.4 (01-14-22 @ 09:00)  Wt(kg): --  WBC Count: 26.01 K/uL (01-15 @ 04:41)  WBC Count: 24.54 K/uL (01-14 @ 22:31)  WBC Count: 35.86 K/uL (01-14 @ 15:38)  WBC Count: 36.72 K/uL (01-14 @ 08:32)    Recent Cultures:  Specimen Source: .Body Fluid Peritoneal Fluid, 01-14 @ 21:59; Results --; Gram Stain:   No polymorphonuclear leukocytes seen  No organisms seen  by cytocentrifuge; Organism: --    Meds:       ENDOCRINE  Capillary Blood Glucose    Meds: dextrose 40% Gel 15 Gram(s) Oral once  dextrose 50% Injectable 50 milliLiter(s) IV Push every 15 minutes  glucagon  Injectable 1 milliGRAM(s) IntraMuscular once  insulin lispro (ADMELOG) corrective regimen sliding scale   SubCutaneous every 4 hours  vasopressin Infusion 0.04 Unit(s)/Min IV Continuous <Continuous>        ACCESS DEVICES:  [ ] Peripheral IV  [ ] Central Venous Line	[ ] R	[ ] L	[ ] IJ	[ ] Fem	[ ] SC	Placed:   [ ] Arterial Line		[ ] R	[ ] L	[ ] Fem	[ ] Rad	[ ] Ax	Placed:   [ ] PICC:					[ ] Mediport  [ ] Urinary Catheter, Date Placed:   [ ] Necessity of urinary, arterial, and venous catheters discussed    OTHER MEDICATIONS:  chlorhexidine 0.12% Liquid 15 milliLiter(s) Oral Mucosa two times a day  chlorhexidine 2% Cloths 1 Application(s) Topical daily      CODE STATUS:     IMAGING:

## 2022-01-16 NOTE — PROGRESS NOTE ADULT - SUBJECTIVE AND OBJECTIVE BOX
24h Events:  RTOR where another segment of small bowel was resected. Continue on small dose of levophed throughout the day.       ICU Vital Signs Last 24 Hrs  T(C): 37.6 (16 Jan 2022 00:07), Max: 38.9 (15 Adam 2022 08:14)  T(F): 99.7 (16 Jan 2022 00:07), Max: 102 (15 Adam 2022 08:14)  HR: 78 (16 Jan 2022 00:32) (73 - 119)  BP: 140/59 (15 Adam 2022 08:00) (123/103 - 140/59)  BP(mean): 82 (15 Adam 2022 08:00) (82 - 110)  ABP: 113/46 (16 Jan 2022 00:00) (96/42 - 156/72)  ABP(mean): 69 (16 Jan 2022 00:00) (56 - 92)  RR: 0 (16 Jan 2022 00:00) (0 - 28)  SpO2: 97% (16 Jan 2022 00:32) (95% - 100%)      ABG - ( 15 Adam 2022 15:15 )  pH, Arterial: 7.350 pH, Blood: x     /  pCO2: 34    /  pO2: 83    / HCO3: 19    / Base Excess: -6.8  /  SaO2: 97.6                MEDICATIONS  (STANDING):  chlorhexidine 0.12% Liquid 15 milliLiter(s) Oral Mucosa two times a day  chlorhexidine 2% Cloths 1 Application(s) Topical daily  dexMEDEtomidine Infusion 0.2 MICROgram(s)/kG/Hr (3.86 mL/Hr) IV Continuous <Continuous>  dextrose 40% Gel 15 Gram(s) Oral once  dextrose 50% Injectable 50 milliLiter(s) IV Push every 15 minutes  fentaNYL   Infusion 1 MICROgram(s)/kG/Hr (7.71 mL/Hr) IV Continuous <Continuous>  glucagon  Injectable 1 milliGRAM(s) IntraMuscular once  heparin   Injectable 5000 Unit(s) SubCutaneous every 8 hours  insulin lispro (ADMELOG) corrective regimen sliding scale   SubCutaneous every 4 hours  multiple electrolytes Injection Type 1 1000 milliLiter(s) (100 mL/Hr) IV Continuous <Continuous>  norepinephrine Infusion 0.05 MICROgram(s)/kG/Min (7.23 mL/Hr) IV Continuous <Continuous>  pantoprazole  Injectable 40 milliGRAM(s) IV Push daily  vasopressin Infusion 0.04 Unit(s)/Min (2.4 mL/Hr) IV Continuous <Continuous>    MEDICATIONS  (PRN):  fentaNYL    Injectable 50 MICROGram(s) IV Push every 3 hours PRN Breakthrough          Physical Exam:    Gen: Resting comfortably in bed, intubated    HEENT: Pinpoint pupils b/l, EOMI, NGT in place    Neurological: Following simple commands, responds appropriately to questions, no focal deficit     Neck: Trachea midline, no evidence of JVD, FROM without pain, neck symmetric    Pulmonary: CTAB with decreased breath sounds at the bases    Cardiovascular: S1S2    Gastrointestinal: Soft, mildly tender, non-distended    : Villegas in place draining yellow urine    Extremities: Edematous    Skin: midline incision without evidence of infection    Musculoskeletal: FROM without pain, no deformity or areas of tenderness    LABS:  Pending      ASSESSMENT/PLAN:  89y Male admitted with mesenteric ischemia. SBR on left indiscontinuity on 1/14, SBR with anastomosis 1/15. Plan for RTOR to ensure viability today    Neuro: Pt was oversedated at the beginning of my shift but has since improved after decreasing the dose of precedex.     CV: Titrate levophed to maintain MAP > 65    Pulm: Continue on ventilator for now, oxygenating and ventilating well con current settings. Will titrate based on AM ABG results. Consider extubation post operatively depending on intro-operative findings    GI/Nutrition: RTOR today, NGT to suction    /Renal: Villegas for strict I&O, f/u am labs    ID: No active issues     Endo: ISS    Skin: Repositioning for DTI prevention while in bed    Heme/DVT Prophylaxis: SCDs, SQH    Lines/Tubes: Continue invasive lines    Dispo: Continue in SICU     24h Events:  RTOR where another segment of small bowel was resected. Pt was reconnected and fascia was closed.  Continued on small dose of levophed throughout the day.       ICU Vital Signs Last 24 Hrs  T(C): 37.6 (16 Jan 2022 00:07), Max: 38.9 (15 Adam 2022 08:14)  T(F): 99.7 (16 Jan 2022 00:07), Max: 102 (15 Adam 2022 08:14)  HR: 78 (16 Jan 2022 00:32) (73 - 119)  BP: 140/59 (15 Adam 2022 08:00) (123/103 - 140/59)  BP(mean): 82 (15 Adam 2022 08:00) (82 - 110)  ABP: 113/46 (16 Jan 2022 00:00) (96/42 - 156/72)  ABP(mean): 69 (16 Jan 2022 00:00) (56 - 92)  RR: 0 (16 Jan 2022 00:00) (0 - 28)  SpO2: 97% (16 Jan 2022 00:32) (95% - 100%)      ABG - ( 15 Adam 2022 15:15 )  pH, Arterial: 7.350 pH, Blood: x     /  pCO2: 34    /  pO2: 83    / HCO3: 19    / Base Excess: -6.8  /  SaO2: 97.6                MEDICATIONS  (STANDING):  chlorhexidine 0.12% Liquid 15 milliLiter(s) Oral Mucosa two times a day  chlorhexidine 2% Cloths 1 Application(s) Topical daily  dexMEDEtomidine Infusion 0.2 MICROgram(s)/kG/Hr (3.86 mL/Hr) IV Continuous <Continuous>  dextrose 40% Gel 15 Gram(s) Oral once  dextrose 50% Injectable 50 milliLiter(s) IV Push every 15 minutes  fentaNYL   Infusion 1 MICROgram(s)/kG/Hr (7.71 mL/Hr) IV Continuous <Continuous>  glucagon  Injectable 1 milliGRAM(s) IntraMuscular once  heparin   Injectable 5000 Unit(s) SubCutaneous every 8 hours  insulin lispro (ADMELOG) corrective regimen sliding scale   SubCutaneous every 4 hours  multiple electrolytes Injection Type 1 1000 milliLiter(s) (100 mL/Hr) IV Continuous <Continuous>  norepinephrine Infusion 0.05 MICROgram(s)/kG/Min (7.23 mL/Hr) IV Continuous <Continuous>  pantoprazole  Injectable 40 milliGRAM(s) IV Push daily  vasopressin Infusion 0.04 Unit(s)/Min (2.4 mL/Hr) IV Continuous <Continuous>    MEDICATIONS  (PRN):  fentaNYL    Injectable 50 MICROGram(s) IV Push every 3 hours PRN Breakthrough          Physical Exam:    Gen: Resting comfortably in bed, intubated    HEENT: Pinpoint pupils b/l, EOMI, NGT in place    Neurological: Following simple commands, responds appropriately to questions, no focal deficit     Neck: Trachea midline, no evidence of JVD, FROM without pain, neck symmetric    Pulmonary: CTAB with decreased breath sounds at the bases    Cardiovascular: S1S2    Gastrointestinal: Soft, mildly tender, non-distended    : Villegas in place draining yellow urine    Extremities: Edematous    Skin: midline incision without evidence of infection    Musculoskeletal: FROM without pain, no deformity or areas of tenderness    LABS:  Pending      ASSESSMENT/PLAN:  89y Male admitted with mesenteric ischemia. SBR on left indiscontinuity on 1/14, SBR with anastomosis 1/15. Plan for RTOR to ensure viability today    Neuro: Pt was oversedated at the beginning of my shift but has since improved after decreasing the dose of precedex.     CV: Titrate levophed to maintain MAP > 65    Pulm: Continue on ventilator for now, oxygenating and ventilating well con current settings. Will titrate based on AM ABG results.     GI/Nutrition: RTOR today, NGT to suction    /Renal: Villegas for strict I&O, f/u am labs    ID: No active issues     Endo: ISS    Skin: Repositioning for DTI prevention while in bed    Heme/DVT Prophylaxis: SCDs, SQH    Lines/Tubes: Continue invasive lines    Dispo: Continue in SICU

## 2022-01-17 NOTE — CHART NOTE - NSCHARTNOTEFT_GEN_A_CORE
R IJ Bandage removed.  4 sutures removed.  Placed in trendelenburg.  TLC REmoved intact, easily without resistance.   Pressure held x10 minutes.  Hemostasis achieved . Bandage placed.  No complications  Well tolerated.

## 2022-01-17 NOTE — PROGRESS NOTE ADULT - ASSESSMENT
89y Male admitted with mesenteric ischemia. SBR on left indiscontinuity on 1/14, SBR with anastomosis 1/15. Cancelled return to OR due to clinical appearance and well appearing bowel at first surgery.    Neuro: GCS 15, no sedation require at this time. Is aware and engaged with care.    CV: Off pressors with map above 65    Pulm: Extubated and able to maintain airway currently. ABG currently reassuring    GI/Nutrition: NPO    /Renal: UOA, Cr improving, now 1.44. Villegas for strict I&O, f/u am labs    ID: No active issues     Endo: ISS    Skin: Repositioning for DTI prevention while in bed    Heme/DVT Prophylaxis: SCDs, SQH    Lines/Tubes: Continue invasive lines    Dispo: Continue in SICU

## 2022-01-17 NOTE — PROGRESS NOTE ADULT - SUBJECTIVE AND OBJECTIVE BOX
HISTORY  89y Male    24 HOUR EVENTS:  Determined no need for RTOR due to clinical improvement and well appearing bowel after resection. Is off pressors and was extubated. Since extubation has not been as interactive but pt indicates it is because of sore throat, likely 2/2 intubation. Spoke with son who said DNR/DNI but pt is saying he is DNR but would accept intubation if respirator status declines but for no longer than 4 days. Pt understands risks and benefits and has capacity. Pt says difficulty breathing is due to sore throat and improves with sips. On duonebs to improve breathing.    SUBJECTIVE/ROS:  [x] A ten-point review of systems was otherwise negative except as noted.  [ ] Due to altered mental status/intubation, subjective information were not able to be obtained from the patient. History was obtained, to the extent possible, from review of the chart and collateral sources of information.      NEURO  RASS:0     GCS: 15    CAM ICU:  Exam: BRISEIDA, moving all extremities spontaneously.   Meds: dexMEDEtomidine Infusion 0.2 MICROgram(s)/kG/Hr IV Continuous <Continuous>  HYDROmorphone  Injectable 0.25 milliGRAM(s) IV Push every 3 hours PRN moderate to severe pain    [x] Adequacy of sedation and pain control has been assessed and adjusted      RESPIRATORY  RR: 23 (01-17-22 @ 00:00) (15 - 30)  SpO2: 97% (01-17-22 @ 00:00) (89% - 99%)  Wt(kg): --  Exam: unlabored, coarse bs to auscultation bilaterally  Mechanical Ventilation: Mode: CPAP with PS, RR (patient): 22, FiO2: 30, PEEP: 5, PS: 5, MAP: 7  ABG - ( 16 Jan 2022 18:38 )  pH: 7.450 /  pCO2: 31    /  pO2: 109   / HCO3: 22    / Base Excess: -2.5  /  SaO2: 98.9    Lactate: x                [ ] Extubation Readiness Assessed  Meds: albuterol/ipratropium for Nebulization 3 milliLiter(s) Nebulizer every 6 hours PRN Shortness of Breath and/or Wheezing        CARDIOVASCULAR  HR: 100 (01-17-22 @ 00:00) (61 - 105)  BP: 146/61 (01-17-22 @ 00:00) (124/66 - 158/82)  BP(mean): 83 (01-17-22 @ 00:00) (75 - 128)  ABP: 135/71 (01-16-22 @ 11:41) (91/54 - 158/59)  ABP(mean): 87 (01-16-22 @ 11:41) (67 - 96)  Wt(kg): --  CVP(cm H2O): --      Exam: S1S2, no murmurs, rubs, gallops.  Cardiac Rhythm: nsr  Perfusion     [ ]Adequate   [ ]Inadequate  Mentation   [ ]Normal       [ ]Reduced  Extremities  [ ]Warm         [ ]Cool  Volume Status [ ]Hypervolemic [ ]Euvolemic [ ]Hypovolemic  Meds:       GI/NUTRITION  Exam: distended, open abdomen, minimal erythema surround wound. Tender to palp  Diet: NPO  Meds: pantoprazole  Injectable 40 milliGRAM(s) IV Push daily      GENITOURINARY  I&O's Detail    01-15 @ 07:01 - 01-16 @ 07:00  --------------------------------------------------------  IN:    Dexmedetomidine: 157.6 mL    FentaNYL: 161.7 mL    Heparin Infusion: 12 mL    multiple electrolytes Injection Type 1.: 2200 mL    Norepinephrine: 88.3 mL  Total IN: 2619.6 mL    OUT:    Indwelling Catheter - Urethral (mL): 2560 mL    Nasogastric/Oral tube (mL): 100 mL    VAC (Vacuum Assisted Closure) System (mL): 0 mL    Vasopressin: 0 mL  Total OUT: 2660 mL    Total NET: -40.4 mL      01-16 @ 07:01 - 01-17 @ 00:12  --------------------------------------------------------  IN:    Dexmedetomidine: 28.7 mL    FentaNYL: 30.8 mL    IV PiggyBack: 100 mL    multiple electrolytes Injection Type 1.: 800 mL    Norepinephrine: 4.2 mL  Total IN: 963.7 mL    OUT:    Indwelling Catheter - Urethral (mL): 775 mL  Total OUT: 775 mL    Total NET: 188.7 mL          01-16    146<H>  |  112<H>  |  60.5<H>  ----------------------------<  206<H>  3.5   |  18.0<L>  |  1.44<H>    Ca    7.5<L>      16 Jan 2022 04:53  Phos  3.3     01-16  Mg     1.9     01-16    TPro  4.5<L>  /  Alb  1.6<L>  /  TBili  0.4  /  DBili  x   /  AST  50<H>  /  ALT  32  /  AlkPhos  112  01-15    [ ] Villegas catheter, indication: N/A  Meds:       HEMATOLOGIC  Meds: heparin   Injectable 5000 Unit(s) SubCutaneous every 8 hours    [x] VTE Prophylaxis                        10.7   20.10 )-----------( 151      ( 16 Jan 2022 04:53 )             32.2     PT/INR - ( 16 Jan 2022 04:54 )   PT: 16.3 sec;   INR: 1.43 ratio         PTT - ( 16 Jan 2022 04:54 )  PTT:29.4 sec  Transfusion     [ ] PRBC   [ ] Platelets   [ ] FFP   [ ] Cryoprecipitate      INFECTIOUS DISEASES  T(C): 37.3 (01-17-22 @ 00:00), Max: 37.6 (01-16-22 @ 01:00)  Wt(kg): --  WBC Count: 20.10 K/uL (01-16 @ 04:53)    Recent Cultures:  Specimen Source: .Body Fluid Peritoneal Fluid, 01-14 @ 21:59; Results   Rare Hafnia alvei  Rare Streptococcus anginosus "Susceptibilities not performed"  Rare Bacteroides fragilis "Susceptibilities not performed"; Gram Stain:   No polymorphonuclear leukocytes seen  No organisms seen  by cytocentrifuge; Organism: Hafnia alvei  Specimen Source: .Blood Blood-Venous, 01-13 @ 16:55; Results   No growth at 48 hours; Gram Stain: --; Organism: --    Meds:       ENDOCRINE  Capillary Blood Glucose    Meds: dextrose 40% Gel 15 Gram(s) Oral once  dextrose 50% Injectable 50 milliLiter(s) IV Push every 15 minutes  glucagon  Injectable 1 milliGRAM(s) IntraMuscular once  insulin lispro (ADMELOG) corrective regimen sliding scale   SubCutaneous every 4 hours        ACCESS DEVICES:  [ ] Peripheral IV  [ ] Central Venous Line	[ ] R	[ ] L	[ ] IJ	[ ] Fem	[ ] SC	Placed:   [ ] Arterial Line		[ ] R	[ ] L	[ ] Fem	[ ] Rad	[ ] Ax	Placed:   [ ] PICC:					[ ] Mediport  [ ] Urinary Catheter, Date Placed:   [ ] Necessity of urinary, arterial, and venous catheters discussed    OTHER MEDICATIONS:  chlorhexidine 2% Cloths 1 Application(s) Topical daily      CODE STATUS: Yes    Yes        IMAGING:    ____ minutes of critical care time spent providing medical care for patient's acute illness/conditions that impairs at least one vital organ system and/or poses a high risk of imminent or life threatening deterioration in the patient's condition. It includes time spent evaluating and treating the patient's acute illness as well as time spent reviewing labs, radiology, discussing goals of care with patient and/or patient's family, and discussing the case with a multidisciplinary team in an effort to prevent further life threatening deterioration or end organ damage. This time is independent of any procedures performed.

## 2022-01-18 NOTE — CHART NOTE - NSCHARTNOTEFT_GEN_A_CORE
Source: Patient [ ]  Family [ ]   other [ x]    Current Diet: Diet, NPO with Tube Feed:   Tube Feeding Modality: Nasogastric  Vital 1.5 Evans (VITAL1.5)  Total Volume for 24 Hours (mL): 240  Continuous  Starting Tube Feed Rate {mL per Hour}: 10  Until Goal Tube Feed Rate (mL per Hour): 10  Tube Feed Duration (in Hours): 24  Tube Feed Start Time: 15:00    Stop Time: 21:00 (01-17-22 @ 14:54)    Enteral /Parenteral Nutrition: Trickle feeds running at 10 ml/hr at this time.     Current Weight:   (1/18) 202.3 lbs  (1/17) 195.3 lbs  (1/13) 169.9 lbs  ? accuracy of weights, noted with 1+ generalized and 2+ b/l hand edema, continue to trend and maintain strict Is&Os     Pertinent Medications: MEDICATIONS  (STANDING):  atorvastatin 40 milliGRAM(s) Oral at bedtime  chlorhexidine 2% Cloths 1 Application(s) Topical daily  heparin   Injectable 5000 Unit(s) SubCutaneous every 8 hours  insulin lispro (ADMELOG) corrective regimen sliding scale   SubCutaneous every 4 hours  melatonin 5 milliGRAM(s) Oral at bedtime  pantoprazole  Injectable 40 milliGRAM(s) IV Push daily    MEDICATIONS  (PRN):  acetaminophen    Suspension .. 650 milliGRAM(s) Oral every 6 hours PRN Mild Pain (1 - 3)  albuterol/ipratropium for Nebulization 3 milliLiter(s) Nebulizer every 6 hours PRN Shortness of Breath and/or Wheezing  ondansetron Injectable 4 milliGRAM(s) IV Push every 4 hours PRN Nausea and/or Vomiting  oxyCODONE    IR 5 milliGRAM(s) Oral every 4 hours PRN Severe Pain (7 - 10)  oxyCODONE    IR 2.5 milliGRAM(s) Oral every 4 hours PRN Moderate Pain (4 - 6)    Pertinent Labs: CBC Full  -  ( 18 Jan 2022 04:27 )  WBC Count : 22.56 K/uL  RBC Count : 3.75 M/uL  Hemoglobin : 10.3 g/dL  Hematocrit : 31.9 %  Platelet Count - Automated : 200 K/uL  Mean Cell Volume : 85.1 fl  Mean Cell Hemoglobin : 27.5 pg  Mean Cell Hemoglobin Concentration : 32.3 gm/dL  Auto Neutrophil # : 20.37 K/uL  Auto Lymphocyte # : 0.79 K/uL  Auto Monocyte # : 0.99 K/uL  Auto Eosinophil # : 0.41 K/uL  Auto Basophil # : 0.00 K/uL  Auto Neutrophil % : 90.3 %  Auto Lymphocyte % : 3.5 %  Auto Monocyte % : 4.4 %  Auto Eosinophil % : 1.8 %  Auto Basophil % : 0.0 %    01-18 Na151 mmol/L<H> Glu 198 mg/dL<H> K+ 3.4 mmol/L<L> Cr  0.77 mg/dL BUN 45.4 mg/dL<H> Phos 2.1 mg/dL<L> Alb n/a   PAB n/a       Skin: Surgical incision to abdomen and skin tear to right arm per documentation    Nutrition focused physical exam previously conducted - found signs of malnutrition [ ]absent [x ]present    Subcutaneous fat loss: [x ] Orbital fat pads region, [ ]Buccal fat region, [ ]Triceps region,  [ ]Ribs region    Muscle wasting: [x ]Temples region, [ x]Clavicle region, [ x]Shoulder region, [ ]Scapula region, [ ]Interosseous region,  [ ]thigh region, [ ]Calf region    Estimated Needs:   [x ] no change since previous assessment  [ ] recalculated:     Current Nutrition Diagnosis: Pt remains at high nutrition risk secondary to malnutrition (severe, acute) related to inability to meet sufficient protein-energy in setting of N/V, poor po intake, now s/p ex lap, found with significant necrosis of the small intestine s/p SBR as evidenced by meeting <50% nutrient needs >5 days, mild muscle loss of temples, moderate muscle loss of clavicles and shoulders, mild fat loss of orbitals, and 1+ generalized edema. Pt now extubated. Failed bedside swallow evaluation, now formal SLP swallow evaluation pending. NGT in place, trickle feeds running. Noted with loose BMs. RD to follow up.      Recommendations:   1) PO diet pending SLP swallow evaluation; if pt deemed safe for po intake, suggest consistency per SLP with low fiber, cons cho modifications in place (+Glucerna TID to optimize po intake and provide an additional 220 kcal, 10g protein per serving).  2) If pt deemed unsafe for po intake, suggest increase tube feeds as feasible by 10 ml/hr q4 hrs until goal rate of 60 ml/hr (x20 hrs) to provide 1200 ml, 1800 kcal, 81g protein, 917 ml free water, and >100% of RDIs for vitamins/minerals; additional free water per MD discretion.   3) Rx: MVI and vitamin C 500mg daily as feasible.  4) Obtain daily weights to monitor trends.     Monitoring and Evaluation:   [ ] PO intake [ x] Tolerance to diet prescription [X] Weights  [X] Follow up per protocol [X] Labs Source: Patient [ ]  Family [ ]   other [ x]    Current Diet: Diet, NPO with Tube Feed:   Tube Feeding Modality: Nasogastric  Vital 1.5 Evans (VITAL1.5)  Total Volume for 24 Hours (mL): 240  Continuous  Starting Tube Feed Rate {mL per Hour}: 10  Until Goal Tube Feed Rate (mL per Hour): 10  Tube Feed Duration (in Hours): 24  Tube Feed Start Time: 15:00    Stop Time: 21:00 (01-17-22 @ 14:54)    Enteral /Parenteral Nutrition: Trickle feeds running at 10 ml/hr at this time.     Current Weight:   (1/18) 202.3 lbs  (1/17) 195.3 lbs  (1/13) 169.9 lbs  ? accuracy of weights, noted with 1+ generalized and 2+ b/l hand edema, continue to trend and maintain strict Is&Os     Pertinent Medications: MEDICATIONS  (STANDING):  atorvastatin 40 milliGRAM(s) Oral at bedtime  chlorhexidine 2% Cloths 1 Application(s) Topical daily  heparin   Injectable 5000 Unit(s) SubCutaneous every 8 hours  insulin lispro (ADMELOG) corrective regimen sliding scale   SubCutaneous every 4 hours  melatonin 5 milliGRAM(s) Oral at bedtime  pantoprazole  Injectable 40 milliGRAM(s) IV Push daily    MEDICATIONS  (PRN):  acetaminophen    Suspension .. 650 milliGRAM(s) Oral every 6 hours PRN Mild Pain (1 - 3)  albuterol/ipratropium for Nebulization 3 milliLiter(s) Nebulizer every 6 hours PRN Shortness of Breath and/or Wheezing  ondansetron Injectable 4 milliGRAM(s) IV Push every 4 hours PRN Nausea and/or Vomiting  oxyCODONE    IR 5 milliGRAM(s) Oral every 4 hours PRN Severe Pain (7 - 10)  oxyCODONE    IR 2.5 milliGRAM(s) Oral every 4 hours PRN Moderate Pain (4 - 6)    Pertinent Labs: CBC Full  -  ( 18 Jan 2022 04:27 )  WBC Count : 22.56 K/uL  RBC Count : 3.75 M/uL  Hemoglobin : 10.3 g/dL  Hematocrit : 31.9 %  Platelet Count - Automated : 200 K/uL  Mean Cell Volume : 85.1 fl  Mean Cell Hemoglobin : 27.5 pg  Mean Cell Hemoglobin Concentration : 32.3 gm/dL  Auto Neutrophil # : 20.37 K/uL  Auto Lymphocyte # : 0.79 K/uL  Auto Monocyte # : 0.99 K/uL  Auto Eosinophil # : 0.41 K/uL  Auto Basophil # : 0.00 K/uL  Auto Neutrophil % : 90.3 %  Auto Lymphocyte % : 3.5 %  Auto Monocyte % : 4.4 %  Auto Eosinophil % : 1.8 %  Auto Basophil % : 0.0 %    01-18 Na151 mmol/L<H> Glu 198 mg/dL<H> K+ 3.4 mmol/L<L> Cr  0.77 mg/dL BUN 45.4 mg/dL<H> Phos 2.1 mg/dL<L> Alb n/a   PAB n/a       Skin: Surgical incision to abdomen and skin tear to right arm per documentation    Nutrition focused physical exam previously conducted - found signs of malnutrition [ ]absent [x ]present    Subcutaneous fat loss: [x ] Orbital fat pads region, [ ]Buccal fat region, [ ]Triceps region,  [ ]Ribs region    Muscle wasting: [x ]Temples region, [ x]Clavicle region, [ x]Shoulder region, [ ]Scapula region, [ ]Interosseous region,  [ ]thigh region, [ ]Calf region    Estimated Needs:   [x ] no change since previous assessment  [ ] recalculated:     Current Nutrition Diagnosis: Pt remains at high nutrition risk secondary to malnutrition (severe, acute) related to inability to meet sufficient protein-energy in setting of N/V, poor po intake, now s/p ex lap, found with significant necrosis of the small intestine s/p SBR as evidenced by meeting <50% nutrient needs >5 days, mild muscle loss of temples, moderate muscle loss of clavicles and shoulders, mild fat loss of orbitals, and 1+ generalized edema. Pt now extubated. Failed swallow evaluation. NGT in place, trickle feeds running. Noted with loose BMs. RD to follow up.      Recommendations:   1) Suggest increase tube feeds as feasible by 10 ml/hr q4 hrs until goal rate of 60 ml/hr (x20 hrs) to provide 1200 ml, 1800 kcal, 81g protein, 917 ml free water, and >100% of RDIs for vitamins/minerals; additional free water per MD discretion.  2) SLP swallow re-evaluation as feasible.    3) Rx: MVI and vitamin C 500mg daily as feasible.  4) Obtain daily weights to monitor trends.     Monitoring and Evaluation:   [ ] PO intake [ x] Tolerance to diet prescription [X] Weights  [X] Follow up per protocol [X] Labs

## 2022-01-18 NOTE — PHYSICAL THERAPY INITIAL EVALUATION ADULT - PERTINENT HX OF CURRENT PROBLEM, REHAB EVAL
89y M w/ PMH HTN, DM, R BKA, presented with severe abdominal pain, lactic acidosis and DKA, found to have mesenteric ischemia, taken to OR 1/14 for ex lap with SBR (180 cm removed), and SMA exploration, RTOR 1/15, additional 30 cm of necrotic bowel removed with side to side anastomosis. Now weaned off vasopressors, extubated, hemodynamically improved.

## 2022-01-18 NOTE — PHYSICAL THERAPY INITIAL EVALUATION ADULT - MANUAL MUSCLE TESTING RESULTS, REHAB EVAL
bilateral shoulder flex, elbow flex WFL; left hip flex 3+/5, knee flex 2/5, ankle df 4/5; right hip flex 3+/5, knee flex 2/5,knee ext 3+/5

## 2022-01-18 NOTE — SWALLOW BEDSIDE ASSESSMENT ADULT - PHARYNGEAL PHASE
~4 swallows per tspn/Complaints of pharyngeal stasis/Multiple swallows Throat clear post oral intake

## 2022-01-18 NOTE — SWALLOW BEDSIDE ASSESSMENT ADULT - COMMENTS
As per MD note: "89y M w/ PMH HTN, DM, R BKA, presented with severe abdominal pain, lactic acidosis and DKA, found to have mesenteric ischemia, taken to OR 1/14 for ex lap with SBR (180 cm removed), and SMA exploration, RTOR 1/15, additional 30 cm of necrotic bowel removed with side to side anastomosis. Now weaned off vasopressors, extubated, hemodynamically improved."

## 2022-01-18 NOTE — SWALLOW BEDSIDE ASSESSMENT ADULT - SLP GENERAL OBSERVATIONS
Pt received & seen seated upright in bed, awake/alert, oriented, hoarse vocal quality, NGT, 02 via NC (sats: 93-96%), 0/10 pain

## 2022-01-18 NOTE — PHYSICAL THERAPY INITIAL EVALUATION ADULT - ADDITIONAL COMMENTS
pt is a poor historian at this time due to some confusion to conversation. per  note, pt lives with his grandson and his family in a house with 1 step to enter. pt is independent with RW or cane and prosthesis ambulatory and with a wc when not wearing his prosthesis.

## 2022-01-18 NOTE — PROGRESS NOTE ADULT - SUBJECTIVE AND OBJECTIVE BOX
24h Events:  Out of bed to chair during the day yesterday. Oxygen requirement improving, weaned to 2L NC. Failed bedside dysphagia, started trickle feeds @ 10 cc/hr. Central line removed yesterday afternoon. No acute events overnight.         ICU Vital Signs Last 24 Hrs  T(C): 37.5 (18 Jan 2022 01:00), Max: 37.5 (17 Jan 2022 09:00)  T(F): 99.5 (18 Jan 2022 01:00), Max: 99.5 (17 Jan 2022 09:00)  HR: 92 (18 Jan 2022 01:00) (87 - 111)  BP: 160/65 (18 Jan 2022 01:00) (131/64 - 169/76)  BP(mean): 91 (18 Jan 2022 01:00) (78 - 118)  ABP: 155/66 (17 Jan 2022 19:00) (7/- - 155/66)  ABP(mean): 218 (18 Jan 2022 01:00) (81 - 218)  RR: 28 (18 Jan 2022 01:00) (19 - 28)  SpO2: 94% (18 Jan 2022 01:00) (89% - 100%)      I&O's Detail    16 Jan 2022 07:01  -  17 Jan 2022 07:00  --------------------------------------------------------  IN:    Dexmedetomidine: 28.7 mL    dextrose 5% + sodium chloride 0.45%: 150 mL    FentaNYL: 30.8 mL    IV PiggyBack: 125 mL    IV PiggyBack: 100 mL    IV PiggyBack: 100 mL    multiple electrolytes Injection Type 1.: 800 mL    Norepinephrine: 4.2 mL  Total IN: 1338.7 mL    OUT:    Indwelling Catheter - Urethral (mL): 1315 mL    Nasogastric/Oral tube (mL): 200 mL    VAC (Vacuum Assisted Closure) System (mL): 200 mL  Total OUT: 1715 mL    Total NET: -376.3 mL      17 Jan 2022 07:01  -  18 Jan 2022 03:12  --------------------------------------------------------  IN:    dextrose 5% + sodium chloride 0.45%: 1425 mL    Free Water: 400 mL    IV PiggyBack: 200 mL    IV PiggyBack: 125 mL    Vital1.5: 80 mL  Total IN: 2230 mL    OUT:    Indwelling Catheter - Urethral (mL): 1105 mL    Nasogastric/Oral tube (mL): 200 mL    VAC (Vacuum Assisted Closure) System (mL): 550 mL  Total OUT: 1855 mL    Total NET: 375 mL          ABG - ( 17 Jan 2022 09:19 )  pH, Arterial: 7.440 pH, Blood: x     /  pCO2: 33    /  pO2: 83    / HCO3: 22    / Base Excess: -1.8  /  SaO2: 98.3                MEDICATIONS  (STANDING):  atorvastatin 40 milliGRAM(s) Oral at bedtime  chlorhexidine 2% Cloths 1 Application(s) Topical daily  dextrose 5% + sodium chloride 0.45%. 1000 milliLiter(s) (75 mL/Hr) IV Continuous <Continuous>  heparin   Injectable 5000 Unit(s) SubCutaneous every 8 hours  insulin lispro (ADMELOG) corrective regimen sliding scale   SubCutaneous every 4 hours  melatonin 5 milliGRAM(s) Oral at bedtime  pantoprazole  Injectable 40 milliGRAM(s) IV Push daily    MEDICATIONS  (PRN):  acetaminophen    Suspension .. 650 milliGRAM(s) Oral every 6 hours PRN Mild Pain (1 - 3)  albuterol/ipratropium for Nebulization 3 milliLiter(s) Nebulizer every 6 hours PRN Shortness of Breath and/or Wheezing  ondansetron Injectable 4 milliGRAM(s) IV Push every 4 hours PRN Nausea and/or Vomiting  oxyCODONE    IR 5 milliGRAM(s) Oral every 4 hours PRN Severe Pain (7 - 10)  oxyCODONE    IR 2.5 milliGRAM(s) Oral every 4 hours PRN Moderate Pain (4 - 6)      Physical Exam:    Gen: Resting comfortably in bed  HEENT: pupils 1mm b/l, reactive, EOMI, NGT in place  Neurological: AOx3, no focal deficits, following commands, able to move all extremities   Neck: Trachea midline, no evidence of JVD, FROM without pain, neck symmetric  Pulmonary: rhonchi, CTA with coarse crackles at bases b/l  Cardiovascular: S1 and S2 present, no murmurs, rubs or gallops  Gastrointestinal: Soft, appropriately tender around incision site, midline incision with vac in place  : Garcia in place draining yellow urine  Extremities: Edematous, +2 pitting edema, RBKA   Skin: intact, midline incision closed with wound vac  Musculoskeletal: FROM without pain, no deformity or areas of tenderness        LABS:  CBC Full  -  ( 17 Jan 2022 04:11 )  WBC Count : 25.99 K/uL  RBC Count : 3.92 M/uL  Hemoglobin : 10.7 g/dL  Hematocrit : 33.1 %  Platelet Count - Automated : 165 K/uL  Mean Cell Volume : 84.4 fl  Mean Cell Hemoglobin : 27.3 pg  Mean Cell Hemoglobin Concentration : 32.3 gm/dL  Auto Neutrophil # : 22.72 K/uL  Auto Lymphocyte # : 1.64 K/uL  Auto Monocyte # : 1.40 K/uL  Auto Eosinophil # : 0.00 K/uL  Auto Basophil # : 0.00 K/uL  Auto Neutrophil % : 87.4 %  Auto Lymphocyte % : 6.3 %  Auto Monocyte % : 5.4 %  Auto Eosinophil % : 0.0 %  Auto Basophil % : 0.0 %    01-17    152<H>  |  119<H>  |  51.8<H>  ----------------------------<  143<H>  3.7   |  21.0<L>  |  1.04    Ca    7.7<L>      17 Jan 2022 17:36  Phos  2.5     01-17  Mg     2.1     01-17      PT/INR - ( 16 Jan 2022 04:54 )   PT: 16.3 sec;   INR: 1.43 ratio         PTT - ( 16 Jan 2022 04:54 )  PTT:29.4 sec    RECENT CULTURES:  01-14 .Body Fluid Peritoneal Fluid Hafnia alvei   No polymorphonuclear leukocytes seen  No organisms seen  by cytocentrifuge   Rare Hafnia alvei  Rare Streptococcus anginosus "Susceptibilities not performed"  Rare Bacteroides fragilis "Susceptibilities not performed"    01-13 .Blood Blood-Venous XXXX XXXX   No growth at 48 hours                ASSESSMENT/PLAN:    89y M w/ PMH HTN, DM, R BKA, presented with severe abdominal pain, lactic acidosis and DKA, found to have mesenteric ischemia, taken to OR 1/14 for ex lap with SBR (180 cm removed), and SMA exploration, RTOR 1/15, additional 30 cm of necrotic bowel removed with side to side anastomosis. Now weaned off vasopressors, extubated, hemodynamically improved.   Neuro:   Discontinue Dilaudid now that patient has enteral access. Start PO pain regiment with 650 mg Tylenol for mild pain, 2.5 mg oxycodone for moderate pain, and 5 mg oxycodone for severe pain. Melatonin for sleep hygiene.   CV:  HD normal, will re-start home atenolol as clinically indicated. Currently blood pressure and heart rate are normal. Re-start home statin.   Pulm:   Wean NC as tolerated. Encourage coughing and deep breathing, incentive spirometry 10x / hr while awake, out of bed to chair during the day, aggressive pulmonary toilet. AM CXR with R LL opacification, increased from prior exam. Likely atelectasis vs. developing infiltrate, continue to monitor  GI/Nutrition:   Continue trickle feeds overnight, formal speech and swallow evaluation in AM. If pt does not pass speech and swallow, increase rate of tube feeds to provide full enteral nutrition. Continue Protonix for GI ppx.  /FE:   Free water 200 q6 started yesterday for hypernatremia, continue D5 ½ NS @ 75. If hypernatremia not improved with AM labs, will increase free water. ELIAN improving. Re-start home flomax in AM if passes speech swallow, otherwise can start Cardura via NGT  ID:   Pt with rising leukocytosis, 20,000 1/16, 26,000 1/17. No other s/sx of infection, pt has been afebrile. Will continue to monitor leukocytosis. No antimicrobials indicated at this point.  Endo:   Monitor blood glucose q4 hours, ISS PRN  Skin:   Repositioning for DTI prevention while in bed  Heme/DVT Prophylaxis:   SCDs, SQH. H/H stable  Lines/Tubes: D/C A line, PIVs only, D/C garcia in AM  Dispo: DNR, consider downgrade in AM

## 2022-01-18 NOTE — SWALLOW BEDSIDE ASSESSMENT ADULT - SWALLOW EVAL: DIAGNOSIS
Oral dysphagia suspected with intake of thin fluids, otherwise WFL. Pharyngeal dysphagia suspected with intake of assessed PO: overt s/s aspiration noted post swallow

## 2022-01-19 NOTE — CHART NOTE - NSCHARTNOTEFT_GEN_A_CORE
SICU TRANSFER NOTE  -----------------------------  ICU Admission Date: 1/14  Transfer Date: 01-19-22 @ 17:49    Admission Diagnosis:   1. Mesenteric ischemia,   2. SVT  3. Hypernatremia    Active Problems/injuries:   1. Hypernatremia  2. Delirium  3. Hyperglycemia    Procedures:   1/14 : Ex lap SBR, SMA Exploration, 180cm removed  1/15: RTOR Partially necrotic bowel. SBR 30cm side to side anastamosis.    Consultants:    Medications  acetaminophen    Suspension .. 650 milliGRAM(s) Oral every 6 hours PRN  albuterol/ipratropium for Nebulization 3 milliLiter(s) Nebulizer every 6 hours PRN  ATENolol  Tablet 25 milliGRAM(s) Oral daily  atorvastatin 40 milliGRAM(s) Oral at bedtime  chlorhexidine 2% Cloths 1 Application(s) Topical daily  dextrose 5%. 1000 milliLiter(s) IV Continuous <Continuous>  heparin   Injectable 5000 Unit(s) SubCutaneous every 8 hours  insulin glargine Injectable (LANTUS) 8 Unit(s) SubCutaneous at bedtime  insulin lispro (ADMELOG) corrective regimen sliding scale   SubCutaneous every 4 hours  melatonin 5 milliGRAM(s) Oral at bedtime  ondansetron Injectable 4 milliGRAM(s) IV Push every 4 hours PRN  oxyCODONE    IR 5 milliGRAM(s) Oral every 4 hours PRN  oxyCODONE    IR 2.5 milliGRAM(s) Oral every 4 hours PRN      [x] I attest I have reviewed and reconciled all medications prior to transfer    IV Fluids  dextrose 5% + sodium chloride 0.45%.: Solution, 1000 milliLiter(s) infuse at 75 mL/Hr  Provider's Contact #: [phone]  sodium chloride 0.9% Bolus:   1000 milliLiter(s), IV Bolus, once, infuse over 60 Minute(s), Stop After 1 Doses  sodium chloride 0.9% Bolus:   1000 milliLiter(s), IV Bolus, once, infuse over 60 Minute(s), Stop After 1 Doses  sodium chloride 0.9% Bolus:   500 milliLiter(s), IV Bolus, once, infuse over 60 Minute(s), Stop After 1 Doses    Antibiotics: None    I have discussed this case with Dr Araujo upon transfer and all questions regarding ICU course were answered.  The following items are to be followed up:  1. STAT BMP.  On D5W since 12 noon.  Adjust as needed to drop Na <0.5 / hr  2. Hyperglycemia.  I ordered Lantus for now.  3. FU SLP for diet advancement  4. If Diarrhea and Leukocytosis continue, consider C.Diff

## 2022-01-19 NOTE — PROGRESS NOTE ADULT - SUBJECTIVE AND OBJECTIVE BOX
24h Events:    24 hour events: Evaluated by speech and swallow, recommending patient remains NPO for now. Tube feeds increased to goal, d/c D5 ½ NS, started free water flushes via NGT.   ICU Vital Signs Last 24 Hrs  T(C): 37.8 (19 Jan 2022 01:00), Max: 38 (18 Jan 2022 23:00)  T(F): 100 (19 Jan 2022 01:00), Max: 100.4 (18 Jan 2022 23:00)  HR: 99 (19 Jan 2022 01:00) (89 - 101)  BP: 159/72 (19 Jan 2022 01:00) (88/67 - 168/69)  BP(mean): 97 (19 Jan 2022 01:00) (74 - 110)  ABP: 100/69 (19 Jan 2022 01:00) (99/94 - 216/211)  ABP(mean): 85 (19 Jan 2022 01:00) (85 - 287)  RR: 30 (19 Jan 2022 01:00) (15 - 30)  SpO2: 96% (19 Jan 2022 01:00) (90% - 98%)      I&O's Detail    17 Jan 2022 07:01  -  18 Jan 2022 07:00  --------------------------------------------------------  IN:    dextrose 5% + sodium chloride 0.45%: 1800 mL    Free Water: 500 mL    IV PiggyBack: 200 mL    IV PiggyBack: 125 mL    Vital1.5: 130 mL  Total IN: 2755 mL    OUT:    Indwelling Catheter - Urethral (mL): 1620 mL    Nasogastric/Oral tube (mL): 200 mL    VAC (Vacuum Assisted Closure) System (mL): 700 mL  Total OUT: 2520 mL    Total NET: 235 mL      18 Jan 2022 07:01  -  19 Jan 2022 03:13  --------------------------------------------------------  IN:    dextrose 5% + sodium chloride 0.45%: 150 mL    Enteral Tube Flush: 80 mL    Free Water: 1500 mL    IV PiggyBack: 250 mL    IV PiggyBack: 300 mL    Vital1.5: 585 mL  Total IN: 2865 mL    OUT:    Indwelling Catheter - Urethral (mL): 1535 mL    VAC (Vacuum Assisted Closure) System (mL): 150 mL  Total OUT: 1685 mL    Total NET: 1180 mL          ABG - ( 17 Jan 2022 09:19 )  pH, Arterial: 7.440 pH, Blood: x     /  pCO2: 33    /  pO2: 83    / HCO3: 22    / Base Excess: -1.8  /  SaO2: 98.3                MEDICATIONS  (STANDING):  ATENolol  Tablet 25 milliGRAM(s) Oral daily  atorvastatin 40 milliGRAM(s) Oral at bedtime  chlorhexidine 2% Cloths 1 Application(s) Topical daily  heparin   Injectable 5000 Unit(s) SubCutaneous every 8 hours  insulin lispro (ADMELOG) corrective regimen sliding scale   SubCutaneous every 4 hours  melatonin 5 milliGRAM(s) Oral at bedtime  pantoprazole  Injectable 40 milliGRAM(s) IV Push daily    MEDICATIONS  (PRN):  acetaminophen    Suspension .. 650 milliGRAM(s) Oral every 6 hours PRN Mild Pain (1 - 3)  albuterol/ipratropium for Nebulization 3 milliLiter(s) Nebulizer every 6 hours PRN Shortness of Breath and/or Wheezing  ondansetron Injectable 4 milliGRAM(s) IV Push every 4 hours PRN Nausea and/or Vomiting  oxyCODONE    IR 5 milliGRAM(s) Oral every 4 hours PRN Severe Pain (7 - 10)  oxyCODONE    IR 2.5 milliGRAM(s) Oral every 4 hours PRN Moderate Pain (4 - 6)      Physical Exam:    Gen: Resting comfortably in bed  HEENT: pupils 1mm b/l, reactive, EOMI, NGT in place  Neurological: AOx3, no focal deficits, following commands, able to move all extremities   Neck: Trachea midline, no evidence of JVD, FROM without pain, neck symmetric  Pulmonary: rhonchi, CTA with coarse crackles at bases b/l  Cardiovascular: S1 and S2 present, no murmurs, rubs or gallops  Gastrointestinal: Soft, appropriately tender around incision site, midline incision with vac in place  : Garcia in place draining yellow urine  Extremities: Edematous, +2 pitting edema, RBKA   Skin: intact, midline incision closed with wound vac  Musculoskeletal: FROM without pain, no deformity or areas of tenderness      LABS:  CBC Full  -  ( 18 Jan 2022 04:27 )  WBC Count : 22.56 K/uL  RBC Count : 3.75 M/uL  Hemoglobin : 10.3 g/dL  Hematocrit : 31.9 %  Platelet Count - Automated : 200 K/uL  Mean Cell Volume : 85.1 fl  Mean Cell Hemoglobin : 27.5 pg  Mean Cell Hemoglobin Concentration : 32.3 gm/dL  Auto Neutrophil # : 20.37 K/uL  Auto Lymphocyte # : 0.79 K/uL  Auto Monocyte # : 0.99 K/uL  Auto Eosinophil # : 0.41 K/uL  Auto Basophil # : 0.00 K/uL  Auto Neutrophil % : 90.3 %  Auto Lymphocyte % : 3.5 %  Auto Monocyte % : 4.4 %  Auto Eosinophil % : 1.8 %  Auto Basophil % : 0.0 %    01-18    151<H>  |  120<H>  |  45.4<H>  ----------------------------<  198<H>  3.4<L>   |  21.0<L>  |  0.77    Ca    7.4<L>      18 Jan 2022 04:27  Phos  2.1     01-18  Mg     1.9     01-18          RECENT CULTURES:  01-14 .Body Fluid Peritoneal Fluid Hafnia alvei   No polymorphonuclear leukocytes seen  No organisms seen  by cytocentrifuge   Rare Hafnia alvei  Rare Streptococcus anginosus "Susceptibilities not performed"  Rare Bacteroides fragilis "Susceptibilities not performed"    01-13 .Blood Blood-Venous XXXX XXXX   No growth at 5 days.                ASSESSMENT/PLAN:    89y M w/ PMH HTN, DM, R BKA, presented with severe abdominal pain, lactic acidosis and DKA, found to have mesenteric ischemia, taken to OR 1/14 for ex lap with SBR (180 cm removed), and SMA exploration, RTOR 1/15, additional 30 cm of necrotic bowel removed with side to side anastomosis. Now weaned off vasopressors, extubated, hemodynamically improved.   Neuro:   - Pain well controlled on current regimen: 650 mg Tylenol for mild pain, 2.5 mg oxycodone for moderate pain, and 5 mg oxycodone for severe pain. Melatonin for sleep hygiene.   CV:  - Restart home atenolol with hold parameters, continue statin  Pulm:   - Wean NC as tolerated. Encourage coughing and deep breathing, incentive spirometry 10x / hr while awake, out of bed to chair during the day, aggressive pulmonary toilet  GI/Nutrition:   - Re-evaluation by speech and swallow in AM, continue full enteral nutrition via NGT for now  /FE:   - Continue free water for hypernatremia, ELIAN resolved  ID:   - Still with elevated WBC, though starting to downtrend, no other s/sx of infection, pt has been afebrile. Will continue to monitor leukocytosis. No antimicrobials.  Endo:   - Monitor blood glucose q4 hours, ISS PRN  Skin:   - Repositioning for DTI prevention while in bed  Heme/DVT Prophylaxis:   - SCDs, SQH. H/H stable  Lines/Tubes:   - D/C A line, PIVs only, D/C garcia in AM  Dispo:   - DNR, consider downgrade in AM

## 2022-01-20 NOTE — PROGRESS NOTE ADULT - SUBJECTIVE AND OBJECTIVE BOX
SUBJECTIVE/24 hour events:  Patient is a 89yMale presenting with mesenteric ischemia s/p on 1/14 : Ex lap SBR, SMA Exploration, 180cm removed, 1/15: RTOR Partially necrotic bowel. SBR 30cm side to side anastamosis. Patient downgraded from the unit with no acute events. Patient course complicated by hyperglycemia (lantus started), hypenatremia On D5      Vital Signs Last 24 Hrs  T(C): 37.4 (19 Jan 2022 23:09), Max: 38.1 (19 Jan 2022 13:00)  T(F): 99.3 (19 Jan 2022 23:09), Max: 100.6 (19 Jan 2022 13:00)  HR: 90 (19 Jan 2022 23:09) (81 - 118)  BP: 133/73 (19 Jan 2022 23:09) (113/47 - 166/70)  BP(mean): 91 (19 Jan 2022 23:09) (57 - 141)  RR: 22 (19 Jan 2022 23:09) (21 - 37)  SpO2: 98% (19 Jan 2022 23:09) (89% - 100%)  Drug Dosing Weight  Height (cm): 162.6 (13 Jan 2022 14:11)  Weight (kg): 77.1 (13 Jan 2022 14:11)  BMI (kg/m2): 29.2 (13 Jan 2022 14:11)  BSA (m2): 1.83 (13 Jan 2022 14:11)  I&O's Detail    18 Jan 2022 07:01  -  19 Jan 2022 07:00  --------------------------------------------------------  IN:    dextrose 5% + sodium chloride 0.45%: 150 mL    Enteral Tube Flush: 180 mL    Free Water: 1800 mL    IV PiggyBack: 300 mL    IV PiggyBack: 312.5 mL    Vital1.5: 855 mL  Total IN: 3597.5 mL    OUT:    Indwelling Catheter - Urethral (mL): 1960 mL    Rectal Tube (mL): 1000 mL    VAC (Vacuum Assisted Closure) System (mL): 350 mL  Total OUT: 3310 mL    Total NET: 287.5 mL      19 Jan 2022 07:01  -  20 Jan 2022 00:11  --------------------------------------------------------  IN:    dextrose 5%: 630 mL    Enteral Tube Flush: 50 mL    Free Water: 350 mL    IV PiggyBack: 187.5 mL    Vital1.5: 540 mL  Total IN: 1757.5 mL    OUT:    Indwelling Catheter - Urethral (mL): 500 mL    Rectal Tube (mL): 500 mL    VAC (Vacuum Assisted Closure) System (mL): 100 mL  Total OUT: 1100 mL    Total NET: 657.5 mL        Allergies    IV Contrast (Unknown)    Intolerances                              10.7   22.44 )-----------( 245      ( 19 Jan 2022 04:02 )             32.7   01-19    153<H>  |  118<H>  |  29.9<H>  ----------------------------<  236<H>  3.0<L>   |  22.0  |  0.85    Ca    7.6<L>      19 Jan 2022 17:41  Phos  2.3     01-19  Mg     1.7     01-19        ROS:    PHYSICAL EXAM:  Constitutional:  Eyes:  ENMT:  Neck:  Breasts:  Back:  Respiratory:  Cardiovascular:  Gastrointestinal:  Genitourinary:  Rectal:  Extremities:  Vascular:  Neurological:  Skin:  Musculoskeletal:  Psychiatric:        MEDICATIONS  (STANDING):  ATENolol  Tablet 25 milliGRAM(s) Oral daily  atorvastatin 40 milliGRAM(s) Oral at bedtime  chlorhexidine 2% Cloths 1 Application(s) Topical daily  dextrose 5%. 1000 milliLiter(s) (125 mL/Hr) IV Continuous <Continuous>  heparin   Injectable 5000 Unit(s) SubCutaneous every 8 hours  insulin glargine Injectable (LANTUS) 8 Unit(s) SubCutaneous at bedtime  insulin lispro (ADMELOG) corrective regimen sliding scale   SubCutaneous every 4 hours  melatonin 5 milliGRAM(s) Oral at bedtime  pantoprazole   Suspension 40 milliGRAM(s) Oral daily  potassium chloride   Powder 40 milliEquivalent(s) Oral every 4 hours    MEDICATIONS  (PRN):  acetaminophen    Suspension .. 650 milliGRAM(s) Oral every 6 hours PRN Mild Pain (1 - 3)  albuterol/ipratropium for Nebulization 3 milliLiter(s) Nebulizer every 6 hours PRN Shortness of Breath and/or Wheezing  ondansetron Injectable 4 milliGRAM(s) IV Push every 4 hours PRN Nausea and/or Vomiting  oxyCODONE    IR 5 milliGRAM(s) Oral every 4 hours PRN Severe Pain (7 - 10)  oxyCODONE    IR 2.5 milliGRAM(s) Oral every 4 hours PRN Moderate Pain (4 - 6)      RADIOLOGY STUDIES:    CULTURES:         SUBJECTIVE/24 hour events:  Patient is a 89yMale presenting with mesenteric ischemia s/p on 1/14 : Ex lap SBR, SMA Exploration, 180cm removed, 1/15: RTOR Partially necrotic bowel. SBR 30cm side to side anastamosis. Patient downgraded from the unit with no acute events. Patient course complicated by hyperglycemia (lantus started), hypenatremia On D5. Patient remains with garcia, wei care and mid-line wound vac to his surgical incision. Patient orientated to self and knows his birthday. NG tube in place for tube feeding.       Vital Signs Last 24 Hrs  T(C): 37.4 (19 Jan 2022 23:09), Max: 38.1 (19 Jan 2022 13:00)  T(F): 99.3 (19 Jan 2022 23:09), Max: 100.6 (19 Jan 2022 13:00)  HR: 90 (19 Jan 2022 23:09) (81 - 118)  BP: 133/73 (19 Jan 2022 23:09) (113/47 - 166/70)  BP(mean): 91 (19 Jan 2022 23:09) (57 - 141)  RR: 22 (19 Jan 2022 23:09) (21 - 37)  SpO2: 98% (19 Jan 2022 23:09) (89% - 100%)  Drug Dosing Weight  Height (cm): 162.6 (13 Jan 2022 14:11)  Weight (kg): 77.1 (13 Jan 2022 14:11)  BMI (kg/m2): 29.2 (13 Jan 2022 14:11)  BSA (m2): 1.83 (13 Jan 2022 14:11)  I&O's Detail    18 Jan 2022 07:01  -  19 Jan 2022 07:00  --------------------------------------------------------  IN:    dextrose 5% + sodium chloride 0.45%: 150 mL    Enteral Tube Flush: 180 mL    Free Water: 1800 mL    IV PiggyBack: 300 mL    IV PiggyBack: 312.5 mL    Vital1.5: 855 mL  Total IN: 3597.5 mL    OUT:    Indwelling Catheter - Urethral (mL): 1960 mL    Rectal Tube (mL): 1000 mL    VAC (Vacuum Assisted Closure) System (mL): 350 mL  Total OUT: 3310 mL    Total NET: 287.5 mL      19 Jan 2022 07:01  -  20 Jan 2022 00:11  --------------------------------------------------------  IN:    dextrose 5%: 630 mL    Enteral Tube Flush: 50 mL    Free Water: 350 mL    IV PiggyBack: 187.5 mL    Vital1.5: 540 mL  Total IN: 1757.5 mL    OUT:    Indwelling Catheter - Urethral (mL): 500 mL    Rectal Tube (mL): 500 mL    VAC (Vacuum Assisted Closure) System (mL): 100 mL  Total OUT: 1100 mL    Total NET: 657.5 mL        Allergies    IV Contrast (Unknown)    Intolerances                              10.7   22.44 )-----------( 245      ( 19 Jan 2022 04:02 )             32.7   01-19    153<H>  |  118<H>  |  29.9<H>  ----------------------------<  236<H>  3.0<L>   |  22.0  |  0.85    Ca    7.6<L>      19 Jan 2022 17:41  Phos  2.3     01-19  Mg     1.7     01-19        ROS:    PHYSICAL EXAM:  Constitutional: in good spirits, " I just want my coffee'  Respiratory: no respiratory distress, no conversational dyspnea, no supplemental o2 needed  Cardiovascular: irregular  Gastrointestinal: abdomen softly distended, wound vac to mid-line surgical wound vac with good seal, wei care in place  Genitourinary: garcia catheter with pink tinged urine    Extremities: Right bka stump well healed, left leg cool to touch   Vascular:  Neurological:  Skin:  Musculoskeletal:  Psychiatric:        MEDICATIONS  (STANDING):  ATENolol  Tablet 25 milliGRAM(s) Oral daily  atorvastatin 40 milliGRAM(s) Oral at bedtime  chlorhexidine 2% Cloths 1 Application(s) Topical daily  dextrose 5%. 1000 milliLiter(s) (125 mL/Hr) IV Continuous <Continuous>  heparin   Injectable 5000 Unit(s) SubCutaneous every 8 hours  insulin glargine Injectable (LANTUS) 8 Unit(s) SubCutaneous at bedtime  insulin lispro (ADMELOG) corrective regimen sliding scale   SubCutaneous every 4 hours  melatonin 5 milliGRAM(s) Oral at bedtime  pantoprazole   Suspension 40 milliGRAM(s) Oral daily  potassium chloride   Powder 40 milliEquivalent(s) Oral every 4 hours    MEDICATIONS  (PRN):  acetaminophen    Suspension .. 650 milliGRAM(s) Oral every 6 hours PRN Mild Pain (1 - 3)  albuterol/ipratropium for Nebulization 3 milliLiter(s) Nebulizer every 6 hours PRN Shortness of Breath and/or Wheezing  ondansetron Injectable 4 milliGRAM(s) IV Push every 4 hours PRN Nausea and/or Vomiting  oxyCODONE    IR 5 milliGRAM(s) Oral every 4 hours PRN Severe Pain (7 - 10)  oxyCODONE    IR 2.5 milliGRAM(s) Oral every 4 hours PRN Moderate Pain (4 - 6)      RADIOLOGY STUDIES:    CULTURES:         SUBJECTIVE/24 hour events:  Patient is a 89yMale presenting with mesenteric ischemia s/p on 1/14 : Ex lap SBR, SMA Exploration, 180cm removed, 1/15: RTOR Partially necrotic bowel. SBR 30cm side to side anastamosis. Patient downgraded from the unit with no acute events. Patient course complicated by hyperglycemia (lantus started), hypenatremia On D5. Patient remains with garcia, wei care and mid-line wound vac to his surgical incision. Patient orientated to self and knows his birthday. NG tube in place for tube feeding.       Vital Signs Last 24 Hrs  T(C): 37.4 (19 Jan 2022 23:09), Max: 38.1 (19 Jan 2022 13:00)  T(F): 99.3 (19 Jan 2022 23:09), Max: 100.6 (19 Jan 2022 13:00)  HR: 90 (19 Jan 2022 23:09) (81 - 118)  BP: 133/73 (19 Jan 2022 23:09) (113/47 - 166/70)  BP(mean): 91 (19 Jan 2022 23:09) (57 - 141)  RR: 22 (19 Jan 2022 23:09) (21 - 37)  SpO2: 98% (19 Jan 2022 23:09) (89% - 100%)  Drug Dosing Weight  Height (cm): 162.6 (13 Jan 2022 14:11)  Weight (kg): 77.1 (13 Jan 2022 14:11)  BMI (kg/m2): 29.2 (13 Jan 2022 14:11)  BSA (m2): 1.83 (13 Jan 2022 14:11)  I&O's Detail    18 Jan 2022 07:01  -  19 Jan 2022 07:00  --------------------------------------------------------  IN:    dextrose 5% + sodium chloride 0.45%: 150 mL    Enteral Tube Flush: 180 mL    Free Water: 1800 mL    IV PiggyBack: 300 mL    IV PiggyBack: 312.5 mL    Vital1.5: 855 mL  Total IN: 3597.5 mL    OUT:    Indwelling Catheter - Urethral (mL): 1960 mL    Rectal Tube (mL): 1000 mL    VAC (Vacuum Assisted Closure) System (mL): 350 mL  Total OUT: 3310 mL    Total NET: 287.5 mL      19 Jan 2022 07:01  -  20 Jan 2022 00:11  --------------------------------------------------------  IN:    dextrose 5%: 630 mL    Enteral Tube Flush: 50 mL    Free Water: 350 mL    IV PiggyBack: 187.5 mL    Vital1.5: 540 mL  Total IN: 1757.5 mL    OUT:    Indwelling Catheter - Urethral (mL): 500 mL    Rectal Tube (mL): 500 mL    VAC (Vacuum Assisted Closure) System (mL): 100 mL  Total OUT: 1100 mL    Total NET: 657.5 mL        Allergies    IV Contrast (Unknown)    Intolerances                              10.7   22.44 )-----------( 245      ( 19 Jan 2022 04:02 )             32.7   01-19    153<H>  |  118<H>  |  29.9<H>  ----------------------------<  236<H>  3.0<L>   |  22.0  |  0.85    Ca    7.6<L>      19 Jan 2022 17:41  Phos  2.3     01-19  Mg     1.7     01-19        ROS:    PHYSICAL EXAM:  Constitutional: in good spirits, " I just want my coffee'  Respiratory: no respiratory distress, no conversational dyspnea, no supplemental o2 needed  Cardiovascular: irregular  Gastrointestinal: abdomen softly distended, wound vac to mid-line surgical wound vac with good seal, wei care in place  Genitourinary: garcia catheter with pink tinged urine  Extremities: Right bka stump well healed, left leg cool to touch poor circulation noted, +DP   Neurological: orientated to self, confused, easily redirectable, knows birthday             MEDICATIONS  (STANDING):  ATENolol  Tablet 25 milliGRAM(s) Oral daily  atorvastatin 40 milliGRAM(s) Oral at bedtime  chlorhexidine 2% Cloths 1 Application(s) Topical daily  dextrose 5%. 1000 milliLiter(s) (125 mL/Hr) IV Continuous <Continuous>  heparin   Injectable 5000 Unit(s) SubCutaneous every 8 hours  insulin glargine Injectable (LANTUS) 8 Unit(s) SubCutaneous at bedtime  insulin lispro (ADMELOG) corrective regimen sliding scale   SubCutaneous every 4 hours  melatonin 5 milliGRAM(s) Oral at bedtime  pantoprazole   Suspension 40 milliGRAM(s) Oral daily  potassium chloride   Powder 40 milliEquivalent(s) Oral every 4 hours    MEDICATIONS  (PRN):  acetaminophen    Suspension .. 650 milliGRAM(s) Oral every 6 hours PRN Mild Pain (1 - 3)  albuterol/ipratropium for Nebulization 3 milliLiter(s) Nebulizer every 6 hours PRN Shortness of Breath and/or Wheezing  ondansetron Injectable 4 milliGRAM(s) IV Push every 4 hours PRN Nausea and/or Vomiting  oxyCODONE    IR 5 milliGRAM(s) Oral every 4 hours PRN Severe Pain (7 - 10)  oxyCODONE    IR 2.5 milliGRAM(s) Oral every 4 hours PRN Moderate Pain (4 - 6)      RADIOLOGY STUDIES:    CULTURES:

## 2022-01-20 NOTE — CONSULT NOTE ADULT - ASSESSMENT
DX :     1) Hypernatremia  2) DM  3) BPH  4) Acidosis  5) Proteinuria    Supplement K+  Proteinuria in the  setting of DM; hx of BKA; necrotic bowel;   T2 Diabetic,     Rec : Will Quantitate 24 Hour UP,     D/W Surgery,

## 2022-01-20 NOTE — PROGRESS NOTE ADULT - ASSESSMENT
DX : Proteinuria,      T2 Diabetic,     Rec : Will Quantitate 24 Hour UP,     If Nephrotic, Will Pursue Serology,      Will further suggest Treatment ,     For now , C/W Present Care,

## 2022-01-20 NOTE — PROGRESS NOTE ADULT - SUBJECTIVE AND OBJECTIVE BOX
ICU Vital Signs Last 24 Hrs,    T(C): 36.7 (2022 04:37), Max: 38.1 (2022 13:00)  T(F): 98.1 (2022 04:37), Max: 100.6 (2022 13:00)  HR: 93 (2022 04:37) (86 - 118)  BP: 125/63 (2022 04:37) (113/47 - 158/130)  BP(mean): 91 (2022 23:09) (57 - 141)  RR: 22 (2022 04:37) (22 - 37)  SpO2: 99% (2022 04:37) (89% - 100%)    153<H>  |  120<H>  |  26.2<H>  ----------------------------<  254<H>  Ca:7.3<L> (2022 05:37)  3.3<L>   |  22.0   |  0.81     eGFR if Non : 76  eGFR if : 88                               10.6<L>  17.90<H> )-----------( 280      ( 2022 05:37 )                  34.1<L>    Phos:1.5 mg/dL<L> M.6 mg/dL PTH:-- Uric acid:-- Serum Osm:335 mosmol/kg<H> ( @ 05:37)    Urinalysis Basic - ( 2022 10:14 )  Color: Red<!> / Appearance: very cloudy<!> / S.020 / pH: x  Gluc: x / Ketone: Negative  / Bili: Negative / Urobili: Negative mg/dL   Blood: x / Protein: 500 mg/dL<!> / Nitrite: Negative   Leuk Esterase: Negative / RBC: >50 /HPF<!> / WBC >50 /HPF<!>   Sq Epi: x / Non Sq Epi: Few / Bacteria: Few<!>    UOsm:640 mosm/kg ( @ 09:24)          89 y/o M with mesenteric ischemia ,  S/P SBR, SMA Exploration, 180cm removed, 1/15:     RT OR  w. Partially necrotic bowel.     SBR 30cm side to side anastomosis,

## 2022-01-20 NOTE — CHART NOTE - NSCHARTNOTEFT_GEN_A_CORE
Today were discussed in rounds to removed NGT from Mr. Guerrero.    Swallowing and speech therapy re-evaluate patient and continue to recommend puree without liquids. They will reassess 1/21/22. Surgery, starting puree diet, today.     DNR form sign by patient as he stated to be DNR in the ICU days before. Charted form today.    Plan:    Removed NGT.    Starting puree diet--reevaluate tolerance.    DNR form signed.    Rest as indicated in the orders.

## 2022-01-20 NOTE — PROGRESS NOTE ADULT - ASSESSMENT
This is a 89 y/o M with mesenteric ischemia post-op from mesenteric ischemia s/p on 1/14 : Ex lap SBR, SMA Exploration, 180cm removed, 1/15: RTOR Partially necrotic bowel. SBR 30cm side to side anastamosis. Downgraded from the the SICU  Plan:  pain control, low dose narcotics and tylenol ATC  hyperglycemia This is a 89 y/o M with mesenteric ischemia post-op from mesenteric ischemia s/p on 1/14 : Ex lap SBR, SMA Exploration, 180cm removed, 1/15: RTOR Partially necrotic bowel. SBR 30cm side to side anastamosis. Downgraded from the the SICU  Plan:  pain control, low dose narcotics and tylenol ATC  hyperglycemia with sliding scale, lantus added  nutrition via tube feeds thru NG tube  wound vac management to ex-lap incision  continue wei care   continue garcia catheter   hypernatremia with D5 and free water boluses   dvt ppx  monitor labs  strict I/O's

## 2022-01-21 NOTE — PROGRESS NOTE ADULT - ASSESSMENT
This is a 89 y/o M with mesenteric ischemia post-op from mesenteric ischemia s/p on 1/14 : Ex lap SBR, SMA Exploration, 180cm removed, 1/15: RTOR Partially necrotic bowel. SBR 30cm side to side anastamosis. NGT removed and tolerating pureed diet.    Plan:  pain control, low dose narcotics and tylenol ATC  hyperglycemia with sliding scale, lantus added  PO nutrition, follow SS re-eval  wound vac management to ex-lap incision  continue wei care   continue garcia catheter   hypernatremia with D5 and free water boluses   dvt ppx  monitor labs  strict I/O's

## 2022-01-21 NOTE — PROGRESS NOTE ADULT - SUBJECTIVE AND OBJECTIVE BOX
[facility] DIVISION OF KIDNEY DISEASES AND HYPERTENSION -- FOLLOW UP NOTE  --------------------------------------------------------------------------------  Chief Complaint:  Hypernatremia    24 hour events/subjective:  Na improving;      PAST HISTORY  --------------------------------------------------------------------------------  No significant changes to PMH, PSH, FHx, SHx, unless otherwise noted    ALLERGIES & MEDICATIONS  --------------------------------------------------------------------------------  Allergies    IV Contrast (Unknown)    Intolerances      Standing Inpatient Medications  ATENolol  Tablet 25 milliGRAM(s) Oral daily  atorvastatin 40 milliGRAM(s) Oral at bedtime  heparin   Injectable 5000 Unit(s) SubCutaneous every 8 hours  insulin glargine Injectable (LANTUS) 8 Unit(s) SubCutaneous at bedtime  insulin lispro (ADMELOG) corrective regimen sliding scale   SubCutaneous every 4 hours  magnesium sulfate  IVPB 2 Gram(s) IV Intermittent once  melatonin 5 milliGRAM(s) Oral at bedtime  pantoprazole  Injectable 40 milliGRAM(s) IV Push daily  potassium phosphate IVPB 30 milliMole(s) IV Intermittent once  tamsulosin 0.4 milliGRAM(s) Oral at bedtime    PRN Inpatient Medications  acetaminophen    Suspension .. 650 milliGRAM(s) Oral every 6 hours PRN  albuterol/ipratropium for Nebulization 3 milliLiter(s) Nebulizer every 6 hours PRN  ondansetron Injectable 4 milliGRAM(s) IV Push every 4 hours PRN  oxyCODONE    IR 5 milliGRAM(s) Oral every 4 hours PRN  oxyCODONE    IR 2.5 milliGRAM(s) Oral every 4 hours PRN      REVIEW OF SYSTEMS  --------------------------------------------------------------------------------  Gen: No weight changes, fatigue, fevers/chills, weakness  Skin: No rashes  Head/Eyes/Ears/Mouth: No headache; Normal hearing; Normal vision w/o blurriness; No sinus pain/discomfort, sore throat  Respiratory: No dyspnea, cough, wheezing, hemoptysis  CV: No chest pain, PND, orthopnea  GI: No abdominal pain, diarrhea, constipation, nausea, vomiting, melena, hematochezia  : No increased frequency, dysuria, hematuria, nocturia  MSK: No joint pain/swelling; no back pain; no edema  Neuro: No dizziness/lightheadedness, weakness, seizures, numbness, tingling  Heme: No easy bruising or bleeding  Endo: No heat/cold intolerance  Psych: No significant nervousness, anxiety, stress, depression    All other systems were reviewed and are negative, except as noted.    VITALS/PHYSICAL EXAM  --------------------------------------------------------------------------------  T(C): 36.5 (01-21-22 @ 06:07), Max: 36.8 (01-20-22 @ 17:36)  HR: 84 (01-21-22 @ 06:07) (84 - 84)  BP: 118/59 (01-21-22 @ 06:07) (118/59 - 128/70)  RR: 18 (01-21-22 @ 06:07) (18 - 20)  SpO2: 91% (01-21-22 @ 06:07) (91% - 91%)  Wt(kg): --        01-20-22 @ 07:01  -  01-21-22 @ 07:00  --------------------------------------------------------  IN: 0 mL / OUT: 925 mL / NET: -925 mL      Physical Exam:  Gen: NAD   Respiratory: CTAbl  Cardiovascular: irregular  Gastrointestinal: abdomen soft, wound vac   Genitourinary: garcia catheter    Extremities: R BKA        LABS/STUDIES  --------------------------------------------------------------------------------              10.1   21.06 >-----------<  218      [01-21-22 @ 09:51]              32.9     141  |  112  |  18.1  ----------------------------<  181      [01-21-22 @ 09:51]  3.3   |  18.0  |  0.71        Ca     6.8     [01-21-22 @ 09:51]      Mg     1.5     [01-21-22 @ 09:51]      Phos  2.1     [01-21-22 @ 09:51]          Serum Osmolality 335      [01-20-22 @ 05:37]    Creatinine Trend:  SCr 0.71 [01-21 @ 09:51]  SCr 0.66 [01-20 @ 16:35]  SCr 0.81 [01-20 @ 05:37]  SCr 0.85 [01-19 @ 17:41]  SCr 0.78 [01-19 @ 04:02]    Urinalysis - [01-20-22 @ 10:14]      Color Red / Appearance very cloudy / SG 1.020 / pH 7.0      Gluc 100 / Ketone Negative  / Bili Negative / Urobili Negative       Blood Moderate / Protein 500 / Leuk Est Negative / Nitrite Negative      RBC >50 / WBC >50 / Hyaline 0-2 / Gran 0-2 / Sq Epi  / Non Sq Epi Few / Bacteria Few    Urine Creatinine 73      [01-20-22 @ 09:23]  Urine Sodium 59      [01-20-22 @ 09:23]  Urine Chloride 115      [01-20-22 @ 09:23]  Urine Osmolality 640      [01-20-22 @ 09:24]

## 2022-01-21 NOTE — PROGRESS NOTE ADULT - ASSESSMENT
DX :    1) Hypernatremia  2) DM  3) BPH  4) Acidosis  5) Proteinuria    Na improving;  Supplement K+  Proteinuria in setting of DM; hx of BKA; necrotic bowel;   Would not pursue further with serologies; not a candidate for high dose steroids even if positive;  Signing off  Please recall if needed

## 2022-01-21 NOTE — PROGRESS NOTE ADULT - SUBJECTIVE AND OBJECTIVE BOX
SUBJECTIVE/24 hour events:  Patient is a 89yMale presenting with mesenteric ischemia s/p on 1/14 : Ex lap SBR, SMA Exploration, 180cm removed, 1/15: RTOR Partially necrotic bowel. SBR 30cm side to side anastamosis. Patient downgraded from the unit with no acute events. Patient course complicated by hyperglycemia (lantus started), hypenatremia On D5. Patient remains with gracia, wei care and mid-line wound vac to his surgical incision. Patient orientated to self and knows his birthday. NG tube removed 1/20 tolerating pureed diet without liquids after eval by speech and swallow.       Vital Signs Last 24 Hrs  T(C): 37.4 (19 Jan 2022 23:09), Max: 38.1 (19 Jan 2022 13:00)  T(F): 99.3 (19 Jan 2022 23:09), Max: 100.6 (19 Jan 2022 13:00)  HR: 90 (19 Jan 2022 23:09) (81 - 118)  BP: 133/73 (19 Jan 2022 23:09) (113/47 - 166/70)  BP(mean): 91 (19 Jan 2022 23:09) (57 - 141)  RR: 22 (19 Jan 2022 23:09) (21 - 37)  SpO2: 98% (19 Jan 2022 23:09) (89% - 100%)  Drug Dosing Weight  Height (cm): 162.6 (13 Jan 2022 14:11)  Weight (kg): 77.1 (13 Jan 2022 14:11)  BMI (kg/m2): 29.2 (13 Jan 2022 14:11)  BSA (m2): 1.83 (13 Jan 2022 14:11)  I&O's Detail    18 Jan 2022 07:01  -  19 Jan 2022 07:00  --------------------------------------------------------  IN:    dextrose 5% + sodium chloride 0.45%: 150 mL    Enteral Tube Flush: 180 mL    Free Water: 1800 mL    IV PiggyBack: 300 mL    IV PiggyBack: 312.5 mL    Vital1.5: 855 mL  Total IN: 3597.5 mL    OUT:    Indwelling Catheter - Urethral (mL): 1960 mL    Rectal Tube (mL): 1000 mL    VAC (Vacuum Assisted Closure) System (mL): 350 mL  Total OUT: 3310 mL    Total NET: 287.5 mL      19 Jan 2022 07:01  -  20 Jan 2022 00:11  --------------------------------------------------------  IN:    dextrose 5%: 630 mL    Enteral Tube Flush: 50 mL    Free Water: 350 mL    IV PiggyBack: 187.5 mL    Vital1.5: 540 mL  Total IN: 1757.5 mL    OUT:    Indwelling Catheter - Urethral (mL): 500 mL    Rectal Tube (mL): 500 mL    VAC (Vacuum Assisted Closure) System (mL): 100 mL  Total OUT: 1100 mL    Total NET: 657.5 mL        Allergies    IV Contrast (Unknown)    Intolerances                              10.7   22.44 )-----------( 245      ( 19 Jan 2022 04:02 )             32.7   01-19    153<H>  |  118<H>  |  29.9<H>  ----------------------------<  236<H>  3.0<L>   |  22.0  |  0.85    Ca    7.6<L>      19 Jan 2022 17:41  Phos  2.3     01-19  Mg     1.7     01-19        ROS:    PHYSICAL EXAM:  Constitutional: in good spirits, " I just want my coffee'  Respiratory: no respiratory distress, no conversational dyspnea, no supplemental o2 needed  Cardiovascular: irregular  Gastrointestinal: abdomen softly distended, wound vac to mid-line surgical wound vac with good seal, wei care in place  Genitourinary: garcia catheter with pink tinged urine  Extremities: Right bka stump well healed, left leg cool to touch poor circulation noted, +DP   Neurological: orientated to self, confused, easily redirectable, knows birthday             MEDICATIONS  (STANDING):  ATENolol  Tablet 25 milliGRAM(s) Oral daily  atorvastatin 40 milliGRAM(s) Oral at bedtime  chlorhexidine 2% Cloths 1 Application(s) Topical daily  dextrose 5%. 1000 milliLiter(s) (125 mL/Hr) IV Continuous <Continuous>  heparin   Injectable 5000 Unit(s) SubCutaneous every 8 hours  insulin glargine Injectable (LANTUS) 8 Unit(s) SubCutaneous at bedtime  insulin lispro (ADMELOG) corrective regimen sliding scale   SubCutaneous every 4 hours  melatonin 5 milliGRAM(s) Oral at bedtime  pantoprazole   Suspension 40 milliGRAM(s) Oral daily  potassium chloride   Powder 40 milliEquivalent(s) Oral every 4 hours    MEDICATIONS  (PRN):  acetaminophen    Suspension .. 650 milliGRAM(s) Oral every 6 hours PRN Mild Pain (1 - 3)  albuterol/ipratropium for Nebulization 3 milliLiter(s) Nebulizer every 6 hours PRN Shortness of Breath and/or Wheezing  ondansetron Injectable 4 milliGRAM(s) IV Push every 4 hours PRN Nausea and/or Vomiting  oxyCODONE    IR 5 milliGRAM(s) Oral every 4 hours PRN Severe Pain (7 - 10)  oxyCODONE    IR 2.5 milliGRAM(s) Oral every 4 hours PRN Moderate Pain (4 - 6)      RADIOLOGY STUDIES:    CULTURES:

## 2022-01-22 NOTE — PROCEDURE NOTE - NSPROCDETAILS_GEN_ALL_CORE
guidewire recovered/lumen(s) aspirated and flushed/sterile dressing applied/sterile technique, catheter placed/ultrasound guidance with use of sterile gel and probe cove
location identified, draped/prepped, sterile technique used, needle inserted/introduced/positive blood return obtained via catheter/connected to a pressurized flush line/sutured in place/Seldinger technique/all materials/supplies accounted for at end of procedure
patient pre-oxygenated, tube inserted, placement confirmed
guidewire recovered/lumen(s) aspirated and flushed/sterile dressing applied/sterile technique, catheter placed/ultrasound guidance with use of sterile gel and probe cove
location identified, draped/prepped, sterile technique used, needle inserted/introduced/positive blood return obtained via catheter/connected to a pressurized flush line/sutured in place/Seldinger technique/all materials/supplies accounted for at end of procedure

## 2022-01-22 NOTE — PROGRESS NOTE ADULT - REASON FOR ADMISSION
Abdominal pain

## 2022-01-22 NOTE — PROGRESS NOTE ADULT - PROVIDER SPECIALTY LIST ADULT
Surgery
Trauma Surgery
SICU
Nephrology
Nephrology
SICU
SICU
Surgery
SICU
SICU
Vascular Surgery
SICU

## 2022-01-22 NOTE — PROGRESS NOTE ADULT - ATTENDING COMMENTS
I have seen and examined the patient during SICU multidisciplinary rounds from 13-14hrs  Events noted.    Neuro: Awake, non focal, GARCIA  CV: HD normal, off pressors  Pulm: SaO2 adequate on supplemental O2  GI: NGT in place, states passing flatus, + BM  : urine flow adequate, electrolytes OK  ID: no Issues  Heme: h/h stable, dvt chemoprophylaxes  Endo: glycemia under control    Plan:  remains extubated.  NGT in place with + flatus  Start trickle feeds  swallow eval  Dvt chemrophaylcyus
Patient seen and examined on am rounds. Eduardo sargent, NTTP, ND. Wound vac in place. No complaints. Needs to continue to work with PT/OT, dispo to [facility], pending placement.
Patient seen and examined on am rounds. Weaning off pressors, lactate cleared. Hemodynamically improved. Dec IVF. Plan for return to OR today for second look, possible bowel anastamosis.
Patient seen and examined on am rounds. Pt awake, alert and following commands this am. Minimal secretions, RSBI of 55. Extubated on rounds. Wound vac in place. Maintain NG, prn breathing treatments. Discussed GOC with pt's grandson, who stated he has a living will and pt wishes to be DNR/DNI, no peg. fent gtt d/c, precidex held. work on weaning conchita (rate vastly decreased compared to yesterday.
seen and examined 01-22-22 @ 1205    afeb  AVSS  soft / moderate right-sided abdominal tenderness / ND  wound VAC functioning    WBC = 20      1/13/2022 - damage-control small bowel resection with SMA exploration and ABThera for nonocclusive mesenteric ischemia  1/15/2022 - small bowel anastomosis and abdominal closure  -abdominal tenderness is concerning for anastomotic leak  -if he has fever, will start ABx and obtain CT abd/pelvis
I have seen and examined the patient during rounds form 8-9a  events noted  unclear etiology of free water deficit: will get serum osmolality, ua and urine lute, on free water replacement.  encourge PO   ensure TID  PT  Diuspo planning

## 2022-01-22 NOTE — PROCEDURE NOTE - NSINFORMCONSENT_GEN_A_CORE
By Grandson./Benefits, risks, and possible complications of procedure explained to patient/caregiver who verbalized understanding and gave verbal consent.
Benefits, risks, and possible complications of procedure explained to patient/caregiver who verbalized understanding and gave verbal consent.
By Grandson over ophone/Benefits, risks, and possible complications of procedure explained to patient/caregiver who verbalized understanding and gave verbal consent.
This was an emergent procedure.
This was an emergent procedure.

## 2022-01-22 NOTE — PROGRESS NOTE ADULT - NUTRITIONAL ASSESSMENT
This patient has been assessed with a concern for Malnutrition and has been determined to have a diagnosis/diagnoses of Severe protein-calorie malnutrition.      
This patient has been assessed with a concern for Malnutrition and has been determined to have a diagnosis/diagnoses of Severe protein-calorie malnutrition.    This patient is being managed with:   Diet NPO with Tube Feed-  Tube Feeding Modality: Nasogastric  Vital 1.5 Evans (VITAL1.5)  Total Volume for 24 Hours (mL): 1080  Continuous  Starting Tube Feed Rate {mL per Hour}: 10  Until Goal Tube Feed Rate (mL per Hour): 45  Tube Feed Duration (in Hours): 24  Tube Feed Start Time: 15:00    Stop Time: 21:00  Entered: Jan 18 2022 11:25AM    
This patient has been assessed with a concern for Malnutrition and has been determined to have a diagnosis/diagnoses of Severe protein-calorie malnutrition.    This patient is being managed with:   Diet Pureed-  Entered: Jan 20 2022  9:43AM    
This patient has been assessed with a concern for Malnutrition and has been determined to have a diagnosis/diagnoses of Severe protein-calorie malnutrition.      
This patient has been assessed with a concern for Malnutrition and has been determined to have a diagnosis/diagnoses of Severe protein-calorie malnutrition.      
This patient has been assessed with a concern for Malnutrition and has been determined to have a diagnosis/diagnoses of Severe protein-calorie malnutrition.    This patient is being managed with:   Diet NPO with Tube Feed-  Tube Feeding Modality: Nasogastric  Vital 1.5 Evans (VITAL1.5)  Total Volume for 24 Hours (mL): 240  Continuous  Starting Tube Feed Rate {mL per Hour}: 10  Until Goal Tube Feed Rate (mL per Hour): 10  Tube Feed Duration (in Hours): 24  Tube Feed Start Time: 15:00    Stop Time: 21:00  Entered: Jan 17 2022  2:54PM    
This patient has been assessed with a concern for Malnutrition and has been determined to have a diagnosis/diagnoses of Severe protein-calorie malnutrition.    This patient is being managed with:   Diet NPO with Tube Feed-  Tube Feeding Modality: Nasogastric  Jevity 1.5 Evans (JEVITY1.5)  Total Volume for 24 Hours (mL): 1080  Continuous  Starting Tube Feed Rate {mL per Hour}: 45  Until Goal Tube Feed Rate (mL per Hour): 45  Tube Feed Duration (in Hours): 24  Tube Feed Start Time: 15:00    Stop Time: 21:00  Entered: Jan 19 2022  7:16AM

## 2022-01-22 NOTE — PROCEDURE NOTE - ADDITIONAL PROCEDURE DETAILS
DX:  AMS  Hy[poxic respiratory failure  Respiratory distress DX:  AMS  Hypoxic respiratory failure  Respiratory distress

## 2022-01-22 NOTE — CONSULT NOTE ADULT - SUBJECTIVE AND OBJECTIVE BOX
SUBJECTIVE/24 hour events:  Patient is a 89yMale presenting with mesenteric ischemia s/p on 1/14 : Ex lap SBR, SMA Exploration, 180cm removed, 1/15: RTOR Partially necrotic bowel. SBR 30cm side to side anastamosis. Patient downgraded from the unit with no acute events. Patient course complicated by hyperglycemia (lantus started), hypenatremia On D5. Patient remains with garcia, wei care and mid-line wound vac to his surgical incision. Patient orientated to self and knows his birthday. NG tube in place for tube feeding.     Vital Signs Last 24 Hrs  T(C): 37.4 (19 Jan 2022 23:09), Max: 38.1 (19 Jan 2022 13:00)  T(F): 99.3 (19 Jan 2022 23:09), Max: 100.6 (19 Jan 2022 13:00)  HR: 90 (19 Jan 2022 23:09) (81 - 118)  BP: 133/73 (19 Jan 2022 23:09) (113/47 - 166/70)  BP(mean): 91 (19 Jan 2022 23:09) (57 - 141)  RR: 22 (19 Jan 2022 23:09) (21 - 37)  SpO2: 98% (19 Jan 2022 23:09) (89% - 100%)    Drug Dosing Weight  Height (cm): 162.6 (13 Jan 2022 14:11)  Weight (kg): 77.1 (13 Jan 2022 14:11)  BMI (kg/m2): 29.2 (13 Jan 2022 14:11)  BSA (m2): 1.83 (13 Jan 2022 14:11)  I&O's Detail    18 Jan 2022 07:01  -  19 Jan 2022 07:00  --------------------------------------------------------  IN:    dextrose 5% + sodium chloride 0.45%: 150 mL    Enteral Tube Flush: 180 mL    Free Water: 1800 mL    IV PiggyBack: 300 mL    IV PiggyBack: 312.5 mL    Vital1.5: 855 mL  Total IN: 3597.5 mL    OUT:    Indwelling Catheter - Urethral (mL): 1960 mL    Rectal Tube (mL): 1000 mL    VAC (Vacuum Assisted Closure) System (mL): 350 mL  Total OUT: 3310 mL    Total NET: 287.5 mL      19 Jan 2022 07:01  -  20 Jan 2022 00:11  --------------------------------------------------------  IN:    dextrose 5%: 630 mL    Enteral Tube Flush: 50 mL    Free Water: 350 mL    IV PiggyBack: 187.5 mL    Vital1.5: 540 mL  Total IN: 1757.5 mL    OUT:    Indwelling Catheter - Urethral (mL): 500 mL    Rectal Tube (mL): 500 mL    VAC (Vacuum Assisted Closure) System (mL): 100 mL  Total OUT: 1100 mL    Total NET: 657.5 mL    Allergies    IV Contrast (Unknown)    Intolerances                        10.7   22.44 )-----------( 245      ( 19 Jan 2022 04:02 )             32.7   01-19    153<H>  |  118<H>  |  29.9<H>  ----------------------------<  236<H>  3.0<L>   |  22.0  |  0.85    Ca    7.6<L>      19 Jan 2022 17:41  Phos  2.3     01-19  Mg     1.7     01-19    ROS: BKA,    S/P Abdominal Surgery,    PHYSICAL EXAM:    Constitutional: in good spirits, " I just want my coffee'  Respiratory: no respiratory distress, no conversational dyspnea, no supplemental o2 needed  Cardiovascular: irregular  Gastrointestinal: abdomen softly distended, wound vac to mid-line surgical wound vac with good seal, wei care in place  Genitourinary: garcia catheter with pink tinged urine  Extremities: Right bka stump well healed, left leg cool to touch poor circulation noted, +DP   Neurological: orientated to self, confused, easily redirectable, knows birthday     MEDICATIONS  (STANDING):  ATENolol  Tablet 25 milliGRAM(s) Oral daily  atorvastatin 40 milliGRAM(s) Oral at bedtime  chlorhexidine 2% Cloths 1 Application(s) Topical daily  dextrose 5%. 1000 milliLiter(s) (125 mL/Hr) IV Continuous <Continuous>  heparin   Injectable 5000 Unit(s) SubCutaneous every 8 hours  insulin glargine Injectable (LANTUS) 8 Unit(s) SubCutaneous at bedtime  insulin lispro (ADMELOG) corrective regimen sliding scale   SubCutaneous every 4 hours  melatonin 5 milliGRAM(s) Oral at bedtime  pantoprazole   Suspension 40 milliGRAM(s) Oral daily  potassium chloride   Powder 40 milliEquivalent(s) Oral every 4 hours    MEDICATIONS  (PRN):  acetaminophen    Suspension .. 650 milliGRAM(s) Oral every 6 hours PRN Mild Pain (1 - 3)  albuterol/ipratropium for Nebulization 3 milliLiter(s) Nebulizer every 6 hours PRN Shortness of Breath and/or Wheezing  ondansetron Injectable 4 milliGRAM(s) IV Push every 4 hours PRN Nausea and/or Vomiting  oxyCODONE    IR 5 milliGRAM(s) Oral every 4 hours PRN Severe Pain (7 - 10)  oxyCODONE    IR 2.5 milliGRAM(s) Oral every 4 hours PRN Moderate Pain (4 - 6)    This is a 89 y/o M with mesenteric ischemia post-op from mesenteric ischemia s/p on 1/14 : Ex lap SBR, SMA Exploration, 180cm removed, 1/15: RTOR Partially necrotic bowel. SBR 30cm side to side anastamosis.     Downgraded from the  SICU:      Plan:    pain control, low dose narcotics and tylenol ATC  hyperglycemia with sliding scale, lantus added  nutrition via tube feeds thru NG tube  wound vac management to ex-lap incision  continue wei care   continue garcia catheter   hypernatremia with D5 and free water boluses     monitor labs    unclear etiology of free water deficit:     will get serum osmolality,     ua and urine lute, on free water replacement.  encourge PO   ensure TID        
  INTERVAL HPI/OVERNIGHT EVENTS/SUBJECTIVE: 89 yo M Called as rapid response with a fever of 102 F at 4:30. RR 30-40 increased O2 to 8L per min since Saturation drop to 88% at 2L on NC. bp 90/60 mmHg and  bpm.  Given 500cc IVF by floor team.  I arrived to find pt mottled, tachypneic, altered and nearly unresponsive.   Pt unable to give any ROS.  Evelio given, Levo ordered, 1L bolus. Placed on NRBM in preparation for intubation.     ICU Vital Signs Last 24 Hrs  T(C): 38.8 (22 Jan 2022 21:40), Max: 38.9 (22 Jan 2022 15:59)  T(F): 101.8 (22 Jan 2022 21:40), Max: 102 (22 Jan 2022 15:59)  HR: 99 (22 Jan 2022 21:40) (9 - 119)  BP: 136/52 (22 Jan 2022 21:40) (68/36 - 136/52)  BP(mean): 77 (22 Jan 2022 21:40) (46 - 77)  ABP: --  ABP(mean): --  RR: 35 (22 Jan 2022 21:40) (20 - 35)  SpO2: 100% (22 Jan 2022 21:40) (85% - 100%)      I&O's Detail    21 Jan 2022 07:01  -  22 Jan 2022 07:00  --------------------------------------------------------  IN:  Total IN: 0 mL    OUT:    Indwelling Catheter - Urethral (mL): 350 mL  Total OUT: 350 mL    Total NET: -350 mL      22 Jan 2022 07:01  -  22 Jan 2022 21:56  --------------------------------------------------------  IN:    FentaNYL: 23.1 mL    IV PiggyBack: 500 mL    multiple electrolytes Injection Type 1 Bolus: 2000 mL    Norepinephrine: 57.8 mL    Vasopressin: 7.2 mL  Total IN: 2588.1 mL    OUT:    Indwelling Catheter - Urethral (mL): 350 mL  Total OUT: 350 mL    Total NET: 2238.1 mL          Mode: AC/ CMV (Assist Control/ Continuous Mandatory Ventilation)  RR (machine): 20  TV (machine): 420  FiO2: 100  PEEP: 10  ITime: 0.8  MAP: 15  PIP: 32    ABG - ( 22 Jan 2022 21:07 )  pH, Arterial: 7.320 pH, Blood: x     /  pCO2: 29    /  pO2: 103   / HCO3: 15    / Base Excess: -11.2 /  SaO2: 99.2                MEDICATIONS  (STANDING):  calcium gluconate IVPB 2 Gram(s) IV Intermittent every 1 hour  chlorhexidine 0.12% Liquid 15 milliLiter(s) Oral Mucosa every 12 hours  chlorhexidine 2% Cloths 1 Application(s) Topical <User Schedule>  chlorhexidine 4% Liquid 1 Application(s) Topical <User Schedule>  fentaNYL   Infusion. 1 MICROgram(s)/kG/Hr (7.71 mL/Hr) IV Continuous <Continuous>  heparin   Injectable 5000 Unit(s) SubCutaneous every 8 hours  insulin glargine Injectable (LANTUS) 8 Unit(s) SubCutaneous at bedtime  insulin lispro (ADMELOG) corrective regimen sliding scale   SubCutaneous every 4 hours  midazolam Injectable 2 milliGRAM(s) IV Push once  multiple electrolytes Injection Type 1 1000 milliLiter(s) (125 mL/Hr) IV Continuous <Continuous>  norepinephrine Infusion 0.05 MICROgram(s)/kG/Min (7.23 mL/Hr) IV Continuous <Continuous>  pantoprazole  Injectable 40 milliGRAM(s) IV Push daily  piperacillin/tazobactam IVPB.. 3.375 Gram(s) IV Intermittent every 8 hours  vasopressin Infusion 0.04 Unit(s)/Min (2.4 mL/Hr) IV Continuous <Continuous>    MEDICATIONS  (PRN):  albuterol/ipratropium for Nebulization 3 milliLiter(s) Nebulizer every 6 hours PRN Shortness of Breath and/or Wheezing  ondansetron Injectable 4 milliGRAM(s) IV Push every 4 hours PRN Nausea and/or Vomiting  sodium chloride 0.9% lock flush 10 milliLiter(s) IV Push every 1 hour PRN Pre/post blood products, medications, blood draw, and to maintain line patency      PHYSICAL EXAM:     Gen: Very ill appearing, +Mild cyanosis, mild Pallor.    Neurological: Opens wyes to noxious stimuli.  Otherwise does not.  Not moving extremities.     Neck: Supple. NT AT, FROM no pain.  No JVD. No meningeal signs    Pulmonary: tachypnea, CTA, = BL .      Cardiovascular: Irregularly irregular., S1, S2, No Murmurs, rubs or gallops noted.    Gastrointestinal: Moderately distended. + Generalized tenderness. Soft.    Extremities: Mottled fingertips.  Cap refill >3 seconds throughout.      LABS:  CBC Full  -  ( 22 Jan 2022 18:08 )  WBC Count : 19.77 K/uL  RBC Count : 3.82 M/uL  Hemoglobin : 10.7 g/dL  Hematocrit : 32.4 %  Platelet Count - Automated : 400 K/uL  Mean Cell Volume : 84.8 fl  Mean Cell Hemoglobin : 28.0 pg  Mean Cell Hemoglobin Concentration : 33.0 gm/dL  Auto Neutrophil # : x  Auto Lymphocyte # : x  Auto Monocyte # : x  Auto Eosinophil # : x  Auto Basophil # : x  Auto Neutrophil % : x  Auto Lymphocyte % : x  Auto Monocyte % : x  Auto Eosinophil % : x  Auto Basophil % : x    01-22    143  |  109<H>  |  23.0<H>  ----------------------------<  140<H>  2.9<LL>   |  14.0<L>  |  1.12    Ca    6.9<L>      22 Jan 2022 18:08  Phos  2.2     01-22  Mg     1.6     01-22          RECENT CULTURES:            CAPILLARY BLOOD GLUCOSE      RADIOLOGY & ADDITIONAL STUDIES:    ASSESSMENT/PLAN:  89yMale presenting with: Known bowel ischemia sp SBR now with acute hypotension, tachypnea, AMS, Shock, likely Septic shock from either anastomotic leak or further bowel ischemia. hypokalemia, anion gap metabolic acidosis. Septic/Metabolic encephalopathy, acute hypoxic respiratory failure.    Neurological: I have pt ordered for Fentanyl infusion after intubation.  AMS likely metabolic/septic encephalopathy but I will perform Head CT To RO ICH. Improve perfusion and AMS will likely improve.    Neck: TLC. Keep site clean    Pulmonary: Pt previously explained to me he would accept intubation "For around 4 days". I have intubated pt. I will place on slightly elevated PEEP of 10, low vt, ABG is adequate except for very poor oxygenation. Pt is compensating for met acidosis    Cardiovascular: I have ordered Levo, placed TLC and A-line. Titrate to MAPs >65. I have added Vaso and given Calcium.  Lactate elevated liekly from shock and potential dead bowel. IVC Flat.  Poorly visualized heart on POCUS.  However, recent TTE Shows EF 75%. I will volume resuscitate with IVF 3L for ~ 3cc/kg and re-evaluate volume status. Pt is DNR    Gastrointestinal: NGT Replaced into good position on CXR. NPO.  NGT To suction.  I have added Protonix since intubated and NPO.  We will get PO Contrast abd CT.  No IV Contrast since pt allergic.  Dr Ardon in agreement.     Genitourinary: Villegas, IVF, Repeat BMP later.      Heme: SQH. SCD    ID: Blood cultures already sent.      Lines/ Tubes:  All invasive lines and tubes needed as above.     Dispo: Pt critically ill.  High chance of death.  I am aggressively supporting multiple body systems to prevent collapse and death.     I have explained the extreme circumstances with Grandfrancia Daniels over phone.  I also have consented him for A-line, TLC and CT. He repeats these things back to me and seems to understand. Also agrees with DNR and trial of intubation.       CRITICAL CARE TIME SPENT: 77 minutes.

## 2022-01-22 NOTE — CONSULT NOTE ADULT - ATTENDING COMMENTS
acute respiratory failure  -intubated on the floor and transferred to SICU  -20 / 420 / 100% / +10  =>  7.32 / 29 / 103 / 15    septic shock  ELIAN from septic shock  -started on ABx  -requiring high-dose norepinephrine infusion  -CT abd/pelvis w/o contrast confirms anastomotic leak  -I readdressed goals of care with his family and will change to comfort measures only when they arrive.

## 2022-01-22 NOTE — PROGRESS NOTE ADULT - ASSESSMENT
This is a 89 y/o M with mesenteric ischemia post-op from mesenteric ischemia s/p on 1/14 : Ex lap SBR, SMA Exploration, 180cm removed, 1/15: RTOR Partially necrotic bowel. SBR 30cm side to side anastamosis. NGT removed and tolerating pureed diet.    Plan:  pain control, low dose narcotics and tylenol ATC  hyperglycemia with sliding scale, lantus added  PO nutrition, puree/NO liquids  wound vac management to ex-lap incision, next change 1/24  renal: 24 hr urine protein  TOV 1/22 since Flowmax off  FU Nephro US 2/2  Hypernatremia workup, D5 and free water boluses   dvt ppx  monitor labs  strict I/O's

## 2022-01-22 NOTE — PROGRESS NOTE ADULT - SUBJECTIVE AND OBJECTIVE BOX
SUBJECTIVE: No acute distress. No acute events overnight. Remains hemodynamically stable.    MEDICATIONS  (STANDING):  ATENolol  Tablet 25 milliGRAM(s) Oral daily  atorvastatin 40 milliGRAM(s) Oral at bedtime  heparin   Injectable 5000 Unit(s) SubCutaneous every 8 hours  insulin glargine Injectable (LANTUS) 8 Unit(s) SubCutaneous at bedtime  insulin lispro (ADMELOG) corrective regimen sliding scale   SubCutaneous every 4 hours  melatonin 5 milliGRAM(s) Oral at bedtime  pantoprazole  Injectable 40 milliGRAM(s) IV Push daily  tamsulosin 0.4 milliGRAM(s) Oral at bedtime    MEDICATIONS  (PRN):  acetaminophen     Tablet .. 650 milliGRAM(s) Oral every 6 hours PRN Moderate Pain (4 - 6)  albuterol/ipratropium for Nebulization 3 milliLiter(s) Nebulizer every 6 hours PRN Shortness of Breath and/or Wheezing  ondansetron Injectable 4 milliGRAM(s) IV Push every 4 hours PRN Nausea and/or Vomiting  oxyCODONE    IR 5 milliGRAM(s) Oral every 4 hours PRN Severe Pain (7 - 10)      Vital Signs Last 24 Hrs  T(C): 36.7 (2022 23:10), Max: 37.1 (2022 16:56)  T(F): 98 (2022 23:10), Max: 98.8 (2022 16:56)  HR: 74 (2022 23:10) (74 - 84)  BP: 108/50 (2022 23:10) (102/52 - 118/59)  BP(mean): --  RR: 20 (2022 23:10) (18 - 22)  SpO2: 94% (2022 21:46) (91% - 94%)    PHYSICAL EXAM:  Constitutional: no acute distress  Respiratory: no respiratory distress, no supplemental o2 needed  Cardiovascular: irregular  Gastrointestinal: abdomen softly distended, wound vac to mid-line surgical wound vac with good seal, wei care in place  Extremities: Right bka stump well healed, left leg cool to touch poor circulation noted, +DP   Neurological: orientated to self, confused, redirectable      I&O's Detail    2022 07:01  -  2022 07:00  --------------------------------------------------------  IN:  Total IN: 0 mL    OUT:    Indwelling Catheter - Urethral (mL): 100 mL    Rectal Tube (mL): 325 mL    Voided (mL): 500 mL  Total OUT: 925 mL    Total NET: -925 mL      2022 07:01  -  2022 02:00  --------------------------------------------------------  IN:  Total IN: 0 mL    OUT:    Indwelling Catheter - Urethral (mL): 150 mL  Total OUT: 150 mL    Total NET: -150 mL          LABS:                        10.1   .06 )-----------( 218      ( 2022 09:51 )             32.9     -    141  |  112<H>  |  18.1  ----------------------------<  181<H>  3.3<L>   |  18.0<L>  |  0.71    Ca    6.8<L>      2022 09:51  Phos  2.1       Mg     1.5             Urinalysis Basic - ( 2022 10:14 )    Color: Red / Appearance: very cloudy / S.020 / pH: x  Gluc: x / Ketone: Negative  / Bili: Negative / Urobili: Negative mg/dL   Blood: x / Protein: 500 mg/dL / Nitrite: Negative   Leuk Esterase: Negative / RBC: >50 /HPF / WBC >50 /HPF   Sq Epi: x / Non Sq Epi: Few / Bacteria: Few

## 2022-01-22 NOTE — CHART NOTE - NSCHARTNOTEFT_GEN_A_CORE
Mr. Salcedo spike a fever of 102 F at 4:30. RR 30-40 increased O2 to 8L per min since Saturation drop to 88% at 2L on NC. bp 90/60 mmHg and  bpm. Proceed to 500 ml of 0.9% stat. Drow blood cultures and repeat CBC and BMP.  Pt is alert, talking, conversational dyspnea and having mild distention of the abdomen and right, one hand span from midline abdominal pain.    Confirmed with family that he is allergic to IV contrast developing severe rash that they can not  describe. Order Ct abdomen and pelvis without contrast.    Plan:  Blood culture.  Start Zosyn IV.  CT of the abdomen without contrast.  500 cc 0.9% Saline IV stat.  CBC and BMP.  Status of the patient and plan discussed early in the morning with Dr. Ardon attending on call. Dr. Ardon is aware if the current status of the patient.

## 2022-01-22 NOTE — CHART NOTE - NSCHARTNOTEFT_GEN_A_CORE
CT abd/pelvis demonstrated a large extravasation of oral contrast from the small bowel anastomosis.  I explained the findings and the patient's condition to his grandson Jeremiah. I offered laparotomy with takedown of the anastomosis and ABThera, but I also explained that there would be no chance of meaningful survival because of limited remaining small bowel. Because of his grave prognosis, I recommended foregoing additional surgery and changing goals of care to comfort measures only. Jeremiah spoke to his mother, and they elected for comfort measures only. They will call their clergy and visit the hospital urgently.

## 2022-01-22 NOTE — PROCEDURE NOTE - NSPROCNAME_GEN_A_CORE
Arterial Puncture/Cannulation
Central Line Insertion
Arterial Puncture/Cannulation
Tracheal Intubation
Central Line Insertion

## 2022-01-23 NOTE — DISCHARGE NOTE FOR THE EXPIRED PATIENT - HOSPITAL COURSE
89 yo M Presented to hospital on  with poor PO intake, NV, x ~ 3 days.  Increasing Abd pain since then.  Admitted for DKA, Lactic acidosis with CT Concerning for mesenteric ishemia.    Went to OR  with Ex lap, SBR of 180 cm necrotic bowel.  Required Pressors intra-post op. No disease to SMA noted.  1/15 RTOR with further 30cm necrotic bowel removed and side to side anastomosis.    Made DNR as per previous wishes.  Able to be extubated. Stated himself that he would accept ~ 4 days of intubation.  : Off pressors several days and downgraded.  : stable in AM.  Began CO Right lower abd pain mid day.  VS Grossly abnormal in early evening. Pt became highly unstable.  Intubated, TLC, Art line, Pressors, Emergent CT after stabilized which revealed anastomotic leak. Dr Ardon discussed risk vs benefit of surgery.  Family opted for comfort care.   Pt made comfortable.  Vasso DCd, Vent DCd     Mr Jethro Loera  comfortably at 01:27 on 2022.    Grandson Jeremiah and Daughter was at bedside.   Dr Ardon pronounced pt.

## 2022-01-23 NOTE — CHART NOTE - NSCHARTNOTESELECT_GEN_ALL_CORE
Fu on symptoms. Surgery./Event Note
Nutrition Services
TLC Removal/Event Note
Event Note
Fever and abdominal pain/Event Note
NGT removed/ DNR form signed/Event Note
SICU Downgrade/Transfer Note
comfort measures only

## 2022-01-24 LAB
-  STREPTOCOCCUS SP. (NOT GRP A, B OR S PNEUMONIAE): SIGNIFICANT CHANGE UP
GRAM STN FLD: SIGNIFICANT CHANGE UP
METHOD TYPE: SIGNIFICANT CHANGE UP
ORGANISM # SPEC MICROSCOPIC CNT: SIGNIFICANT CHANGE UP
SPECIMEN SOURCE: SIGNIFICANT CHANGE UP

## 2022-01-25 LAB
CULTURE RESULTS: SIGNIFICANT CHANGE UP
ORGANISM # SPEC MICROSCOPIC CNT: SIGNIFICANT CHANGE UP
SURGICAL PATHOLOGY STUDY: SIGNIFICANT CHANGE UP

## 2022-01-27 LAB
CULTURE RESULTS: SIGNIFICANT CHANGE UP
SPECIMEN SOURCE: SIGNIFICANT CHANGE UP

## 2022-01-31 ENCOUNTER — APPOINTMENT (OUTPATIENT)
Dept: INTERNAL MEDICINE | Facility: CLINIC | Age: 87
End: 2022-01-31

## 2025-02-16 NOTE — PATIENT PROFILE ADULT - NSPRONUTRITIONRISK_GEN_A_NUR
Matilde IRIZARRYA ambulated patient with pulse ox, O2 remained between 99 and 97 during ambulation .   No indicators present

## 2025-05-13 NOTE — ED PROVIDER NOTE - SKIN NEGATIVE STATEMENT, MLM
Pt calling back and states she has an appt with her dentist tomorrow and will ask them how long do they expect for filling to completely heal. She states she takes 4 abx prior to any dental procedure.   no abrasions, no jaundice, no lesions, no pruritis, and no rashes.

## 2025-05-22 NOTE — ED ADULT NURSE NOTE - NS ED PATIENT SAFETY CONCERN
Detail Level: Detailed Render Post-Care Instructions In Note?: no Spray Paint Text: The liquid nitrogen was applied to the skin utilizing a spray paint frosting technique. Medical Necessity Information: It is in your best interest to select a reason for this procedure from the list below. All of these items fulfill various CMS LCD requirements except the new and changing color options. Show Spray Paint Technique Variable?: Yes Application Tool (Optional): Liquid Nitrogen Sprayer Duration Of Freeze Thaw-Cycle (Seconds): 5-10 Medical Necessity Clause: This procedure was medically necessary because the lesions that were treated were: Consent: The patient's consent was obtained including but not limited to risks of crusting, scabbing, blistering, scarring, darker or lighter pigmentary change, recurrence, incomplete removal and infection. Post-Care Instructions: I reviewed with the patient in detail post-care instructions. Patient is to wear sunprotection, and avoid picking at any of the treated lesions. Pt may apply Vaseline to crusted or scabbing areas. No Number Of Freeze-Thaw Cycles: 2 freeze-thaw cycles

## (undated) DEVICE — DRSG STERISTRIPS 0.5X4"

## (undated) DEVICE — MEDICATION LABELS W MARKER

## (undated) DEVICE — SUT PDS II 1 48" TP-1

## (undated) DEVICE — SPONGE RAYTEC 4X4 16PLY

## (undated) DEVICE — SOL IRR POUR H2O 250ML

## (undated) DEVICE — DRAPE INSTRUMENT POUCH

## (undated) DEVICE — PREP CHLORAPREP ORANGE 2PCT 26ML

## (undated) DEVICE — GLV 8.5 PROTEXIS

## (undated) DEVICE — TROCAR COVIDIEN VERSAONE BLUNT TIP HASSAN 12MM

## (undated) DEVICE — DRAPE TOWEL BLUE 17" X 24"

## (undated) DEVICE — BASIN SPECIAL PROCEDURE

## (undated) DEVICE — BLANKET WARMER LOWER ADULT

## (undated) DEVICE — DRAPE TOWEL BLUE STICKY

## (undated) DEVICE — ELCTR GROUNDING PAD ADULT COVIDIEN

## (undated) DEVICE — SUT DERMABOND 0.7ML

## (undated) DEVICE — SUT SOFSILK 2-0 18" V-20

## (undated) DEVICE — GOWN TRIMAX LG

## (undated) DEVICE — PACK GENERAL LAPAROSCOPY

## (undated) DEVICE — GLV 6.5 PROTEXIS

## (undated) DEVICE — FOLEY TRAY 16FR 5CC LTX UMETER CLOSED

## (undated) DEVICE — DRSG OPSITE POSTOP 13.75"X4"

## (undated) DEVICE — BLANKET WARMER UPPER ADULT

## (undated) DEVICE — DRSG BANDAID 0.75X3"

## (undated) DEVICE — TROCAR COVIDIEN VERSAONE OPTICAL BLADELESS 5MM

## (undated) DEVICE — GLV 7 PROTEXIS

## (undated) DEVICE — BLADE SAFETY LOCK #10

## (undated) DEVICE — GLV 8 PROTEXIS

## (undated) DEVICE — TROCAR COVIDIEN VISIPORT PLUS 5-12MM

## (undated) DEVICE — STAPLER SKIN VISI-STAT 35 WIDE

## (undated) DEVICE — WRAP COMPRESSION CALF MED

## (undated) DEVICE — WRAP COMPRESSION CALF LG

## (undated) DEVICE — POSITIONER FOAM EGG CRATE ULNAR (2PCS)

## (undated) DEVICE — PACK MAJOR ABDOMINAL SUPINE

## (undated) DEVICE — CONTAINER SPECIMEN 100ML

## (undated) DEVICE — STAPLER COVIDIEN ENDO GIA STANDARD HANDLE

## (undated) DEVICE — DRAPE MAYO STAND 30"

## (undated) DEVICE — SPONGE LAP X-RAY DETECTABLE 18X18"

## (undated) DEVICE — LIGASURE BLUNT TIP NANO CTD 37CM

## (undated) DEVICE — TUBING STRYKEFLOW II SUCTION / IRRIGATOR

## (undated) DEVICE — MARKER SKIN MULTI TIP 6"

## (undated) DEVICE — Device

## (undated) DEVICE — SUT SOFSILK 3-0 18" V-20

## (undated) DEVICE — SOL IRR POUR NS 0.9% 500ML

## (undated) DEVICE — D HELP - CLEARVIEW CLEARIFY SYSTEM

## (undated) DEVICE — BLADE SURGICAL #11 CARBON

## (undated) DEVICE — DRSG VAC ABTHERS

## (undated) DEVICE — BLADE SAFETY LOCK #15

## (undated) DEVICE — GLV 7.5 PROTEXIS

## (undated) DEVICE — SUT VICRYL 0 27" CT-2 UNDYED

## (undated) DEVICE — MARKER SKIN W RULER LABELS

## (undated) DEVICE — TROCAR COVIDIEN VERSAPORT OPTICAL BLADELESS 12MM STD

## (undated) DEVICE — SYR CONTROL LUER LOK 10CC

## (undated) DEVICE — LIGASURE IMPACT

## (undated) DEVICE — CANNULA COVIDIEN VERSAONE UNIVERSAL STANDARD 5MM

## (undated) DEVICE — SUT MONOCRYL 4-0 27" PS-2 UNDYED